# Patient Record
Sex: FEMALE | Race: WHITE | Employment: FULL TIME | URBAN - METROPOLITAN AREA
[De-identification: names, ages, dates, MRNs, and addresses within clinical notes are randomized per-mention and may not be internally consistent; named-entity substitution may affect disease eponyms.]

---

## 2017-03-02 ENCOUNTER — HOSPITAL ENCOUNTER (OUTPATIENT)
Dept: PHYSICAL THERAPY | Age: 22
Discharge: HOME | End: 2017-03-02
Payer: OTHER GOVERNMENT

## 2017-03-02 PROCEDURE — 97110 THERAPEUTIC EXERCISES: CPT | Performed by: PHYSICAL THERAPIST

## 2017-03-02 PROCEDURE — 97162 PT EVAL MOD COMPLEX 30 MIN: CPT | Performed by: PHYSICAL THERAPIST

## 2017-03-02 PROCEDURE — 97014 ELECTRIC STIMULATION THERAPY: CPT | Performed by: PHYSICAL THERAPIST

## 2017-03-02 PROCEDURE — 97140 MANUAL THERAPY 1/> REGIONS: CPT | Performed by: PHYSICAL THERAPIST

## 2017-03-02 NOTE — PROGRESS NOTES
PT DAILY TREATMENT NOTE 3-16    Patient Name: Bao Nam  Date:3/2/2017  : 1995  [x]  Patient  Verified  Payor:  / Plan: [employer] [facility] [address] AND DEPENDENTS / Product Type:  /    In time:10:40  Out time:11:20  Total Treatment Time (min): 40  Total Billable Time: 40  Visit #: 1 of 8-12    Treatment Area: Low back pain [M54.5]    SUBJECTIVE  Pain Level (0-10 scale): 3  Any medication changes, allergies to medications, adverse drug reactions, diagnosis change, or new procedure performed?: [x] No    [] Yes (see summary sheet for update)  Subjective functional status/changes:   [] No changes reported  I am feeling ok today. I feel I am out of alignment. OBJECTIVE    Modality Rationale: Modality 1:  decrease edema, decrease inflammation, decrease pain, increase tissue extensibility and increase muscle contraction/control to improve the patients ability to return to adl's.      Min Type Additional Details   15 [] Estim:  [x]Unatt       []IFC  []Premod                        []Other:  []w/ice   [x]w/heat  Position:prone Location:Lumbar    [] Estim: []Att    []TENS instruct  []NMES                    []Other:  []w/US   []w/ice   []w/heat  Position:  Location:    []  Traction: [] Cervical       []Lumbar                       [] Prone          []Supine                       []Intermittent   []Continuous Lbs:  [] before manual  [] after manual    []  Ultrasound: []Continuous   [] Pulsed                           []1MHz   []3MHz W/cm2:  Location:    []  Iontophoresis with dexamethasone         Location: [] Take home patch   [] In clinic    []  Ice     []  heat  []  Ice massage  []  Laser   []  Anodyne Position:  Location:    []  Laser with stim  []  Other:  Position:  Location:    []  Vasopneumatic Device Pressure:       [] lo [] med [] hi   Temperature: [] lo [] med [] hi   [] Skin assessment post-treatment:  []intact []redness- no adverse reaction         []redness - adverse reaction:     15 min [x]Eval                  []Re-Eval       10 min Manual Therapy:  SI correction for anterior rotation/press down. Left upslip correction   Rationale: decrease pain, increase ROM, increase tissue extensibility and decrease edema  to return to adl's. With   [] TE   [] TA   [] neuro   [] other: Patient Education: [x] Review HEP    [] Progressed/Changed HEP based on:   [] positioning   [] body mechanics   [] transfers   [] heat/ice application    [] other:      Other Objective/Functional Measures: as per eval.     Pain Level (0-10 scale) post treatment: 0    ASSESSMENT/Changes in Function:     Patient will continue to benefit from skilled PT services to modify and progress therapeutic interventions, address functional mobility deficits, address ROM deficits, address strength deficits, analyze and address soft tissue restrictions, analyze and cue movement patterns, analyze and modify body mechanics/ergonomics, assess and modify postural abnormalities, address imbalance/dizziness and instruct in home and community integration to attain remaining goals. [x]  See Plan of Care  []  See progress note/recertification  []  See Discharge Summary         Progress towards goals / Updated goals: · Short Term Goals: To be accomplished in 2 weeks:  1. Patient will be compliant with home exercise program.  2. Patient will demonstrate proper lifting technique in order to return to work activities. · Long Term Goals: To be accomplished in 4 weeks:  1. Patient will increase FOTO Functional Status score to 76 to indicate decreased functional limitations. 2. Patient will report ability to walk for  > 60 minutes without increasing back pain to indicate increased activity tolerance. 3. Patient will be independent with home exercise program for self management of symptoms.   Justification for Eval Code Complexity:  Patient History : gastric sleeve/LBP  Examination strength/ROM/ positive ant rotation R with supine sit test.  Clinical Presentation: evolving and changing  Clinical Decision Making : XHEJ 04    PLAN  []  Upgrade activities as tolerated     [x]  Continue plan of care  []  Update interventions per flow sheet       []  Discharge due to:_  []  Other:_      Ayden Camposather A Case, DPT 3/2/2017  11:22 AM    Future Appointments  Date Time Provider Karely Sloan   3/10/2017 10:00 AM [place] Le[place] A Case, DPT [facility]   3/16/2017 11:30 AM [place] A Case, DPT [facility]   3/17/2017 10:00 AM [place] A Case, DPT [facility]   3/23/2017 12:00 PM Madeleine Garciaes Case, DPT [facility]   3/24/2017 10:00 AM Madeleine Hector Case, DPT [facility]   3/30/2017 2:30 PM Pebbles A Case, DPT [facility]   3/31/2017 10:00 AM Pebbles A Case, DPT [facility]
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
## 2017-03-10 ENCOUNTER — APPOINTMENT (OUTPATIENT)
Dept: PHYSICAL THERAPY | Age: 22
End: 2017-03-10
Payer: OTHER GOVERNMENT

## 2017-03-16 ENCOUNTER — HOSPITAL ENCOUNTER (OUTPATIENT)
Dept: PHYSICAL THERAPY | Age: 22
Discharge: HOME | End: 2017-03-16
Payer: OTHER GOVERNMENT

## 2017-03-16 PROCEDURE — 97140 MANUAL THERAPY 1/> REGIONS: CPT | Performed by: PHYSICAL THERAPIST

## 2017-03-16 PROCEDURE — 97014 ELECTRIC STIMULATION THERAPY: CPT | Performed by: PHYSICAL THERAPIST

## 2017-03-16 PROCEDURE — 97110 THERAPEUTIC EXERCISES: CPT | Performed by: PHYSICAL THERAPIST
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
## 2017-03-17 ENCOUNTER — HOSPITAL ENCOUNTER (OUTPATIENT)
Dept: PHYSICAL THERAPY | Age: 22
Discharge: HOME | End: 2017-03-17
Payer: OTHER GOVERNMENT

## 2017-03-17 PROCEDURE — 97140 MANUAL THERAPY 1/> REGIONS: CPT | Performed by: PHYSICAL THERAPIST

## 2017-03-17 PROCEDURE — 97110 THERAPEUTIC EXERCISES: CPT | Performed by: PHYSICAL THERAPIST

## 2017-03-17 PROCEDURE — 97014 ELECTRIC STIMULATION THERAPY: CPT | Performed by: PHYSICAL THERAPIST

## 2017-03-17 NOTE — PROGRESS NOTES
PT DAILY TREATMENT NOTE 3-16    Patient Name: Reid Lubin  Date:3/17/2017  : 1995  [x]  Patient  Verified  Payor: [facility] / Plan: [employer] [facility] [address] AND DEPENDENTS / Product Type: Maggie Barrs /    In time:10:05 Out time:11:05  Total Treatment Time (min): 60  Total Billable Time: NA  Visit #: 3 of 8-12    Treatment Area: Low back pain [M54.5]    SUBJECTIVE  Pain Level (0-10 scale): 3-4  Any medication changes, allergies to medications, adverse drug reactions, diagnosis change, or new procedure performed?: [x] No    [] Yes (see summary sheet for update)  Subjective functional status/changes:   [] No changes reported  I am feeling sore today. OBJECTIVE    Modality Rationale: Modality 1:  decrease edema, decrease inflammation and increase tissue extensibility to improve the patients ability to return to adl's.      Min Type Additional Details   15 [x] Estim:  []Unatt       [x]IFC  []Premod                        []Other:  []w/ice   [x]w/heat  Position:prone Location:Lumbar spine    [] Estim: []Att    []TENS instruct  []NMES                    []Other:  []w/US   []w/ice   []w/heat  Position:  Location:    []  Traction: [] Cervical       []Lumbar                       [] Prone          []Supine                       []Intermittent   []Continuous Lbs:  [] before manual  [] after manual    []  Ultrasound: []Continuous   [] Pulsed                           []1MHz   []3MHz W/cm2:  Location:    []  Iontophoresis with dexamethasone         Location: [] Take home patch   [] In clinic    []  Ice     []  heat  []  Ice massage  []  Laser   []  Anodyne Position:  Location:    []  Laser with stim  []  Other:  Position:  Location:    []  Vasopneumatic Device Pressure:       [] lo [] med [] hi   Temperature: [] lo [] med [] hi   [] Skin assessment post-treatment:  []intact []redness- no adverse reaction         []redness - adverse reaction:              35 min Therapeutic Exercise:  [x] See flow sheet :   Rationale: increase ROM, increase strength, improve coordination, improve balance and increase proprioception to improve the patients ability to return to adl's. 10 min Manual Therapy: R anterior rotation with correction/L upslip with correction. Rationale: decrease pain, increase ROM, increase tissue extensibility, correct positional vertigo, decrease trigger points and increase postural awareness to return to adl's. With   [] TE   [] TA   [] neuro   [] other: Patient Education: [x] Review HEP    [] Progressed/Changed HEP based on:   [] positioning   [] body mechanics   [] transfers   [] heat/ice application    [] other:      Other Objective/Functional Measures:    Decreased R anterior rotation today noted with supine to sit test.  Patient appears to have improved tolerance with with added exercises today. Pain Level (0-10 scale) post treatment: 3    ASSESSMENT/Changes in Function:   Patient appeared to have good tolerance with added row and extension activities today. Patient will continue to benefit from skilled PT services to modify and progress therapeutic interventions, address functional mobility deficits, address ROM deficits, address strength deficits, analyze and address soft tissue restrictions, analyze and cue movement patterns, analyze and modify body mechanics/ergonomics, assess and modify postural abnormalities, address imbalance/dizziness and instruct in home and community integration to attain remaining goals. [x]  See Plan of Care  []  See progress note/recertification  []  See Discharge Summary         Progress towards goals / Updated goals: · Short Term Goals: To be accomplished in 2 weeks:  1. Patient will be compliant with home exercise program.  2. Patient will demonstrate proper lifting technique in order to return to work activities. · Long Term Goals: To be accomplished in 4 weeks:  1.  Patient will increase FOTO Functional Status score to 76 to indicate decreased functional limitations. 2. Patient will report ability to walk for  > 60 minutes without increasing back pain to indicate increased activity tolerance. 3. Patient will be independent with home exercise program for self management of symptoms.       PLAN  []  Upgrade activities as tolerated     [x]  Continue plan of care  []  Update interventions per flow sheet       []  Discharge due to:_  []  Other:_      Sandra Borjas, MIKI 3/17/2017  10:05 AM    Future Appointments  Date Time Provider Karely Sloan   3/23/2017 12:00 PM Taiwo Borjas, [facility] [address]   3/24/2017 10:00 AM Taiwo Borjas DPT [facility] [address]   3/30/2017 2:30 PM Taiwo Borjas, MIKI [facility] [address]   3/31/2017 10:00 AM Pebbles Borjas DPT [facility] [address]

## 2017-03-23 ENCOUNTER — HOSPITAL ENCOUNTER (OUTPATIENT)
Dept: PHYSICAL THERAPY | Age: 22
Discharge: HOME | End: 2017-03-23
Payer: OTHER GOVERNMENT

## 2017-03-23 PROCEDURE — 97110 THERAPEUTIC EXERCISES: CPT | Performed by: PHYSICAL THERAPIST

## 2017-03-23 PROCEDURE — 97140 MANUAL THERAPY 1/> REGIONS: CPT | Performed by: PHYSICAL THERAPIST

## 2017-03-23 PROCEDURE — 97014 ELECTRIC STIMULATION THERAPY: CPT | Performed by: PHYSICAL THERAPIST

## 2017-03-23 NOTE — PROGRESS NOTES
PT DAILY TREATMENT NOTE 3-16    Patient Name: Timoteo August  Date:3/23/2017  : 1995  [x]  Patient  Verified  Payor:  / Plan: [facility] [address] AND DEPENDENTS / Product Type:  /    In time:12:05 Out time:1:05  Total Treatment Time (min): 60  Total Billable Time: NA  Visit #: 4 of -12    Treatment Area: Low back pain [M54.5]    SUBJECTIVE  Pain Level (0-10 scale): 3-4  Any medication changes, allergies to medications, adverse drug reactions, diagnosis change, or new procedure performed?: [x] No    [] Yes (see summary sheet for update)  Subjective functional status/changes:   [] No changes reported  I am really sore today. OBJECTIVE    Modality Rationale: Modality 1:  decrease edema, decrease inflammation and increase tissue extensibility to improve the patients ability to return to adl's.      Min Type Additional Details   15 [x] Estim:  []Unatt       [x]IFC  []Premod                        []Other:  []w/ice   [x]w/heat  Position:prone Location:Lumbar spine    [] Estim: []Att    []TENS instruct  []NMES                    []Other:  []w/US   []w/ice   []w/heat  Position:  Location:    []  Traction: [] Cervical       []Lumbar                       [] Prone          []Supine                       []Intermittent   []Continuous Lbs:  [] before manual  [] after manual    []  Ultrasound: []Continuous   [] Pulsed                           []1MHz   []3MHz W/cm2:  Location:    []  Iontophoresis with dexamethasone         Location: [] Take home patch   [] In clinic    []  Ice     []  heat  []  Ice massage  []  Laser   []  Anodyne Position:  Location:    []  Laser with stim  []  Other:  Position:  Location:    []  Vasopneumatic Device Pressure:       [] lo [] med [] hi   Temperature: [] lo [] med [] hi   [] Skin assessment post-treatment:  []intact []redness- no adverse reaction         []redness - adverse reaction:              35 min Therapeutic Exercise:  [x] See flow sheet :   Rationale: increase ROM, increase strength, improve coordination, improve balance and increase proprioception to improve the patients ability to return to adl's. 10 min Manual Therapy: R anterior rotation with correction/L upslip with correction. Shot gun technique. MFR to Lumbar paraspinals QL release. Rationale: decrease pain, increase ROM, increase tissue extensibility, correct positional vertigo, decrease trigger points and increase postural awareness to return to adl's. With   [] TE   [] TA   [] neuro   [] other: Patient Education: [x] Review HEP    [] Progressed/Changed HEP based on:   [] positioning   [] body mechanics   [] transfers   [] heat/ice application    [] other:      Other Objective/Functional Measures:    Decreased R anterior rotation today noted with supine to sit test. Discussed possible massage therapy. Patient has sig. Tenderness with palpation to B paraspinals. Pain Level (0-10 scale) post treatment: 0    ASSESSMENT/Changes in Function:   Patient appeared to have sig. Relief with manual tech. Today. Patient has sig increase in pain today for unknown reasons. Patient will continue to benefit from skilled PT services to modify and progress therapeutic interventions, address functional mobility deficits, address ROM deficits, address strength deficits, analyze and address soft tissue restrictions, analyze and cue movement patterns, analyze and modify body mechanics/ergonomics, assess and modify postural abnormalities, address imbalance/dizziness and instruct in home and community integration to attain remaining goals. [x]  See Plan of Care  []  See progress note/recertification  []  See Discharge Summary         Progress towards goals / Updated goals: · Short Term Goals: To be accomplished in 2 weeks:  1. Patient will be compliant with home exercise program.  2. Patient will demonstrate proper lifting technique in order to return to work activities. · Long Term Goals:  To be accomplished in 4 weeks:  1. Patient will increase FOTO Functional Status score to 76 to indicate decreased functional limitations. 2. Patient will report ability to walk for  > 60 minutes without increasing back pain to indicate increased activity tolerance. 3. Patient will be independent with home exercise program for self management of symptoms.       PLAN  []  Upgrade activities as tolerated     [x]  Continue plan of care  []  Update interventions per flow sheet       []  Discharge due to:_  []  Other:_      Melia WELLER Case, MIKI 3/23/2017  1:05 PM    Future Appointments  Date Time Provider Karely Sloan   3/24/2017 10:00 AM Melia Mack A Case, DPT [facility]   3/30/2017 2:30 PM Melia WELLER Case, DPT [facility]   3/31/2017 10:00 AM Melia Mack A Case, DPT [facility]

## 2017-03-24 ENCOUNTER — HOSPITAL ENCOUNTER (OUTPATIENT)
Dept: PHYSICAL THERAPY | Age: 22
Discharge: HOME | End: 2017-03-24
Payer: OTHER GOVERNMENT

## 2017-03-24 PROCEDURE — 97110 THERAPEUTIC EXERCISES: CPT | Performed by: PHYSICAL THERAPIST

## 2017-03-24 PROCEDURE — 97014 ELECTRIC STIMULATION THERAPY: CPT | Performed by: PHYSICAL THERAPIST

## 2017-03-24 NOTE — PROGRESS NOTES
PT DAILY TREATMENT NOTE 3-16    Patient Name: Ronen Curtis  Date:3/24/2017  : 1995  [x]  Patient  Verified  Payor:  / Plan: [employer] [address] [facility] [address] AND DEPENDENTS / Product Type: [employer] /    In time:10:05 Out time:11:10  Total Treatment Time (min): 65  Total Billable Time: NA  Visit #: 5 of 8-12    Treatment Area: Low back pain [M54.5]    SUBJECTIVE  Pain Level (0-10 scale): 2-3  Any medication changes, allergies to medications, adverse drug reactions, diagnosis change, or new procedure performed?: [x] No    [] Yes (see summary sheet for update)  Subjective functional status/changes:   [] No changes reported  I am feeling sore but feel that I may be in good alignment. OBJECTIVE    Modality Rationale: Modality 1:  decrease edema, decrease inflammation and increase tissue extensibility to improve the patients ability to return to adl's.      Min Type Additional Details   15 [x] Estim:  []Unatt       [x]IFC  []Premod                        []Other:  []w/ice   [x]w/heat  Position:prone Location:Lumbar spine    [] Estim: []Att    []TENS instruct  []NMES                    []Other:  []w/US   []w/ice   []w/heat  Position:  Location:    []  Traction: [] Cervical       []Lumbar                       [] Prone          []Supine                       []Intermittent   []Continuous Lbs:  [] before manual  [] after manual    []  Ultrasound: []Continuous   [] Pulsed                           []1MHz   []3MHz W/cm2:  Location:    []  Iontophoresis with dexamethasone         Location: [] Take home patch   [] In clinic    []  Ice     []  heat  []  Ice massage  []  Laser   []  Anodyne Position:  Location:    []  Laser with stim  []  Other:  Position:  Location:    []  Vasopneumatic Device Pressure:       [] lo [] med [] hi   Temperature: [] lo [] med [] hi   [] Skin assessment post-treatment:  []intact []redness- no adverse reaction         []redness - adverse reaction:              50 min Therapeutic Exercise: [x] See flow sheet :   Rationale: increase ROM, increase strength, improve coordination, improve balance and increase proprioception to improve the patients ability to return to adl's. NC min Manual Therapy: checked alignment. Rationale: decrease pain, increase ROM, increase tissue extensibility, correct positional vertigo, decrease trigger points and increase postural awareness to return to adl's. With   [] TE   [] TA   [] neuro   [] other: Patient Education: [x] Review HEP    [] Progressed/Changed HEP based on:   [] positioning   [] body mechanics   [] transfers   [] heat/ice application    [] other:      Other Objective/Functional Measures:    Patient appeared to have good response to manual tech. Last session. Patient will be getting massage this evening. Pain Level (0-10 scale) post treatment: 0    ASSESSMENT/Changes in Function:   Patient in good alignment with supine to sit test today. Patient will continue to benefit from skilled PT services to modify and progress therapeutic interventions, address functional mobility deficits, address ROM deficits, address strength deficits, analyze and address soft tissue restrictions, analyze and cue movement patterns, analyze and modify body mechanics/ergonomics, assess and modify postural abnormalities, address imbalance/dizziness and instruct in home and community integration to attain remaining goals. [x]  See Plan of Care  []  See progress note/recertification  []  See Discharge Summary         Progress towards goals / Updated goals: · Short Term Goals: To be accomplished in 2 weeks:  1. Patient will be compliant with home exercise program.  2. Patient will demonstrate proper lifting technique in order to return to work activities. · Long Term Goals: To be accomplished in 4 weeks:  1. Patient will increase FOTO Functional Status score to 76 to indicate decreased functional limitations.   2. Patient will report ability to walk for  > 60 minutes without increasing back pain to indicate increased activity tolerance. 3. Patient will be independent with home exercise program for self management of symptoms.       PLAN  []  Upgrade activities as tolerated     [x]  Continue plan of care  []  Update interventions per flow sheet       []  Discharge due to:_  []  Other:_      Real Borjas, DPT 3/24/2017  1:05 PM    Future Appointments  Date Time Provider Karely Sloan   3/30/2017 2:30 PM James Borjas, DPT [facility]   3/31/2017 10:00 AM Real Borjas, DPT [facility]

## 2017-03-30 ENCOUNTER — HOSPITAL ENCOUNTER (OUTPATIENT)
Dept: PHYSICAL THERAPY | Age: 22
Discharge: HOME | End: 2017-03-30
Payer: OTHER GOVERNMENT

## 2017-03-30 PROCEDURE — 97014 ELECTRIC STIMULATION THERAPY: CPT | Performed by: PHYSICAL THERAPIST

## 2017-03-30 PROCEDURE — 97110 THERAPEUTIC EXERCISES: CPT | Performed by: PHYSICAL THERAPIST

## 2017-03-30 NOTE — PROGRESS NOTES
PT DAILY TREATMENT NOTE 3-16    Patient Name: Jose A Schulte  Date:3/30/2017  : 1995  [x]  Patient  Verified  Payor:  / Plan: [employer] [facility] [address] AND DEPENDENTS / Product Type:  /    In time:2:30 Out time:3:40  Total Treatment Time (min): 70  Total Billable Time: NA  Visit #: 6 of 8-12    Treatment Area: Low back pain [M54.5]    SUBJECTIVE  Pain Level (0-10 scale): 1  Any medication changes, allergies to medications, adverse drug reactions, diagnosis change, or new procedure performed?: [x] No    [] Yes (see summary sheet for update)  Subjective functional status/changes:   [] No changes reported  The massage really helped.     OBJECTIVE    Modality Rationale: Modality 1:  decrease edema, decrease inflammation, decrease pain, increase tissue extensibility and increase muscle contraction/control to improve the patients ability to return      Min Type Additional Details   15 [x] Estim:  []Unatt       []IFC  []Premod                        []Other:  []w/ice   [x]w/heat  Position: Lumbar  Location:    [] Estim: []Att    []TENS instruct  []NMES                    []Other:  []w/US   []w/ice   []w/heat  Position:  Location:    []  Traction: [] Cervical       []Lumbar                       [] Prone          []Supine                       []Intermittent   []Continuous Lbs:  [] before manual  [] after manual    []  Ultrasound: []Continuous   [] Pulsed                           []1MHz   []3MHz W/cm2:  Location:    []  Iontophoresis with dexamethasone         Location: [] Take home patch   [] In clinic    []  Ice     []  heat  []  Ice massage  []  Laser   []  Anodyne Position:  Location:    []  Laser with stim  []  Other:  Position:  Location:    []  Vasopneumatic Device Pressure:       [] lo [] med [] hi   Temperature: [] lo [] med [] hi   [] Skin assessment post-treatment:  []intact []redness- no adverse reaction         []redness - adverse reaction:              55 min Therapeutic Exercise:  [x] See flow sheet :   Rationale: increase ROM, increase strength, improve coordination, improve balance and increase proprioception to improve the patients ability to return to adl's. With   [] TE   [] TA   [] neuro   [] other: Patient Education: [x] Review HEP    [] Progressed/Changed HEP based on:   [] positioning   [] body mechanics   [] transfers   [] heat/ice application    [] other:      Other Objective/Functional Measures: Discussed possible benefit for TENS unit. Pain Level (0-10 scale) post treatment: 1    ASSESSMENT/Changes in Function:     Patient will continue to benefit from skilled PT services to modify and progress therapeutic interventions, address functional mobility deficits, address ROM deficits, address strength deficits, analyze and address soft tissue restrictions, analyze and cue movement patterns, analyze and modify body mechanics/ergonomics, assess and modify postural abnormalities, address imbalance/dizziness and instruct in home and community integration to attain remaining goals. [x]  See Plan of Care  []  See progress note/recertification  []  See Discharge Summary         Progress towards goals / Updated goals: · Short Term Goals: To be accomplished in 2 weeks:  1. Patient will be compliant with home exercise program.  2. Patient will demonstrate proper lifting technique in order to return to work activities. · Long Term Goals: To be accomplished in 4 weeks:  1. Patient will increase FOTO Functional Status score to 76 to indicate decreased functional limitations. 2. Patient will report ability to stand > 30 minutes without increasing back pain to indicate increased activity tolerance. 3. Patient will be independent with home exercise program for self management of symptoms.     PLAN  []  Upgrade activities as tolerated     [x]  Continue plan of care  []  Update interventions per flow sheet       []  Discharge due to:_  []  Other:_      Jenna WELLER Case, DPT 3/30/2017  2:33 PM    Future Appointments  Date Time Provider Karely Sloan   3/31/2017 10:00 AM Serena WELLER Case, DPT [facility]

## 2017-03-31 ENCOUNTER — HOSPITAL ENCOUNTER (OUTPATIENT)
Dept: PHYSICAL THERAPY | Age: 22
Discharge: HOME | End: 2017-03-31
Payer: OTHER GOVERNMENT

## 2017-03-31 PROCEDURE — 97140 MANUAL THERAPY 1/> REGIONS: CPT | Performed by: PHYSICAL THERAPIST

## 2017-03-31 PROCEDURE — 97110 THERAPEUTIC EXERCISES: CPT | Performed by: PHYSICAL THERAPIST

## 2017-03-31 PROCEDURE — 97014 ELECTRIC STIMULATION THERAPY: CPT | Performed by: PHYSICAL THERAPIST

## 2017-03-31 NOTE — PROGRESS NOTES
[address] [facility]  [address] [place] [address],[address], [facility], [place]  Phone [phone]  Fax  [facility]          Patient Name: Tej Gibson : 1995   Treatment/Medical Diagnosis: Low back pain [M54.5]   Onset Date: 10/3/17    Referral Source: James Zavala MD [facility]): 3/2/17   Prior Hospitalization: See Medical History Provider #: [id number]   Prior Level of Function: I with ADL's. Comorbidities: As listed. Medications: Verified on Patient Summary List   Visits from [facility]: 7 Missed Visits: 0     · Short Term Goals: To be accomplished in 2 weeks:  1. Patient will be compliant with home exercise program.  Met goal at this time. 2. Patient will demonstrate proper lifting technique in order to return to work activities. I with ADL's. Met goal at this time. · Long Term Goals: To be accomplished in 4 weeks:  1. Patient will increase FOTO Functional Status score to 76 to indicate decreased functional limitations. FOTO: 73 progressing towards goal.  FOTO:63 has not met goal.  2. Patient will report ability to walk for  > 60 minutes without increasing back pain to indicate increased activity tolerance. Met goal at this time. 3. Patient will be independent with home exercise program for self management of symptoms. Met goal at this time. G-Codes (GP): NA  Assessments/Recommendations: Discontinue therapy. Progressing towards or have reached established goals. Patient would benefit from Home TENS unit for pain control. Please have MD sign if agrees. If you have any questions/comments please contact us directly at [phone]. Thank you for allowing us to assist in the care of your patient.     Therapist Signature: Arcadio Borjas DPT Date: 3/31/17   Reporting Period: NA Time: 10:51 AM

## 2017-03-31 NOTE — PROGRESS NOTES
PT DAILY TREATMENT NOTE 3-16    Patient Name: Jorge Nathan  Date:3/31/2017  : 1995  [x]  Patient  Verified  Payor: [facility] / Plan: [employer] [facility] [address] AND DEPENDENTS / Product Type: Andrew [employer]us /    In time:10:00  Out time:11:00  Total Treatment Time (min): 60  Total Billable Time: NA  Visit #: 7 of 8-12    Treatment Area: Low back pain [M54.5]    SUBJECTIVE  Pain Level (0-10 scale): 6  Any medication changes, allergies to medications, adverse drug reactions, diagnosis change, or new procedure performed?: [x] No    [] Yes (see summary sheet for update)  Subjective functional status/changes:   [] No changes reported  As per DC. OBJECTIVE    Modality Rationale: Modality 1:  decrease edema, decrease inflammation, decrease pain, increase tissue extensibility and increase muscle contraction/control to improve the patients ability to return to adl's.    Min Type Additional Details   10 [] Estim:  [x]Unatt       []IFC  []Premod                        []Other:  []w/ice   []w/heat  Position:  Location:    [] Estim: []Att    []TENS instruct  []NMES                    []Other:  []w/US   []w/ice   []w/heat  Position:  Location:    []  Traction: [] Cervical       []Lumbar                       [] Prone          []Supine                       []Intermittent   []Continuous Lbs:  [] before manual  [] after manual    []  Ultrasound: []Continuous   [] Pulsed                           []1MHz   []3MHz W/cm2:  Location:    []  Iontophoresis with dexamethasone         Location: [] Take home patch   [] In clinic    []  Ice     []  heat  []  Ice massage  []  Laser   []  Anodyne Position:  Location:    []  Laser with stim  []  Other:  Position:  Location:    []  Vasopneumatic Device Pressure:       [] lo [] med [] hi   Temperature: [] lo [] med [] hi   [] Skin assessment post-treatment:  []intact []redness- no adverse reaction         []redness - adverse reaction:      min []Eval                  []Re-Eval     10 min manual to the Lumbar spine/MFR to B paraspinals. 40 min Therapeutic Exercise:  [x] See flow sheet :   Rationale: increase ROM, increase strength, improve coordination, improve balance and increase proprioception to improve the patients ability to return to adl's. With   [] TE   [] TA   [] neuro   [] other: Patient Education: [x] Review HEP    [] Progressed/Changed HEP based on:   [] positioning   [] body mechanics   [] transfers   [] heat/ice application    [] other:      Other Objective/Functional Measures: as per DC. Pain Level (0-10 scale) post treatment: 5-6    ASSESSMENT/Changes in Function:     Patient will continue to benefit from skilled PT services to modify and progress therapeutic interventions, address functional mobility deficits, address ROM deficits, address strength deficits, analyze and address soft tissue restrictions, analyze and cue movement patterns, analyze and modify body mechanics/ergonomics, assess and modify postural abnormalities, address imbalance/dizziness and instruct in home and community integration to attain remaining goals. [x]  See Plan of Care  []  See progress note/recertification  []  See Discharge Summary         Progress towards goals / Updated goals:  PLAN  []  Upgrade activities as tolerated     [x]  Continue plan of care  []  Update interventions per flow sheet       []  Discharge due to:_  []  Other:_      Pebbles WELLER Case, DPT 3/31/2017  10:51 AM    No future appointments.

## 2018-06-04 ENCOUNTER — HOSPITAL ENCOUNTER (OUTPATIENT)
Dept: PHYSICAL THERAPY | Age: 23
Discharge: HOME | End: 2018-06-04
Payer: COMMERCIAL

## 2018-06-04 PROCEDURE — 97014 ELECTRIC STIMULATION THERAPY: CPT | Performed by: PHYSICAL THERAPIST

## 2018-06-04 PROCEDURE — 97110 THERAPEUTIC EXERCISES: CPT | Performed by: PHYSICAL THERAPIST

## 2018-06-04 PROCEDURE — 97162 PT EVAL MOD COMPLEX 30 MIN: CPT | Performed by: PHYSICAL THERAPIST
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
## 2018-06-11 ENCOUNTER — HOSPITAL ENCOUNTER (OUTPATIENT)
Dept: PHYSICAL THERAPY | Age: 23
Discharge: HOME | End: 2018-06-11
Payer: COMMERCIAL

## 2018-06-11 PROCEDURE — 97014 ELECTRIC STIMULATION THERAPY: CPT | Performed by: PHYSICAL THERAPIST

## 2018-06-11 PROCEDURE — 97140 MANUAL THERAPY 1/> REGIONS: CPT | Performed by: PHYSICAL THERAPIST

## 2018-06-11 PROCEDURE — 97110 THERAPEUTIC EXERCISES: CPT | Performed by: PHYSICAL THERAPIST

## 2018-06-11 NOTE — PROGRESS NOTES
PT DAILY TREATMENT NOTE     Patient Name: Kerri Gray  Date:2018  : 1995  [x]  Patient  Verified  Payor:  / Plan: José Manuel Silveira 73 / Product Type:  /    In time:935am  Out time:1045  Total Treatment Time (min): 70  Total Timed Codes (min): 65  1:1 Treatment Time (min):    Visit #: 2 of     Treatment Area: Low back pain [M54.5]    SUBJECTIVE  Pain Level (0-10 scale): 3-4  Any medication changes, allergies to medications, adverse drug reactions, diagnosis change, or new procedure performed?: [x] No    [] Yes (see summary sheet for update)  Subjective functional status/changes:   [] No changes reported  It really bothers me the worst when I am lying down, and go to try and get up.      OBJECTIVE  Modality rationale: decrease inflammation, decrease pain and increase tissue extensibility to improve the patients ability to perform functional mobility and improve activity  endurance     Min Type Additional Details   15 [x] Estim: []Att   []Unatt        []TENS instruct                  []IFC  [x]Premod   []NMES                     []Other:  []w/US   []w/ice   [x]w/heat  Position:  Location:    []  Traction: [] Cervical       []Lumbar                       [] Prone          []Supine                       []Intermittent   []Continuous Lbs:  [] before manual  [] after manual    []  Ultrasound: []Continuous   [] Pulsed                           []1MHz   []3MHz Location:  W/cm2:    []  Iontophoresis with dexamethasone         Location: [] Take home patch   [] In clinic    []  Ice     []  heat  []  Ice massage Position:  Location:    []  Vasopneumatic Device Pressure:       [] lo [] med [] hi   Temperature: [] lo [] med [] hi   [] Skin assessment post-treatment:  []intact []redness- no adverse reaction       []redness - adverse reaction:       45/40 min Therapeutic Exercise:  [] See flow sheet :   Rationale: increase ROM, increase strength and improve coordination to improve the patients ability to maintain pelvic alignment and strength for functional mobility         10 min Manual Therapy:  Alignment check reveals L upslip and post rot innominate, MET performed with cane, TrPR to R piriformis and ART, sacral decompression MFR. Rationale: decrease pain, increase tissue extensibility, decrease trigger points and increase postural awareness to be able to maintain pelvic alignment            x min Patient Education: [x] Review HEP    [] Progressed/Changed HEP based on:   [] positioning   [] body mechanics   [] transfers   [] heat/ice application        Other Objective/Functional Measures: Initiated therex per flow sheet, decreased endurance with ABD and clam therex. Challenged with OH med ball lifts with maintaining TA draw/ppt. TrP noted in R piriformis with MT. Performed MET with mild correction noted and cavitation on R with long axis distraction. Pain Level (0-10 scale) post treatment: 3-4    ASSESSMENT/Changes in Function: L upslip noted with Post rot innominate, weakness and fatigue noted in B hip ABD and required min A for 8 reps. Increased pain on R with clams and hip ABD    Patient will continue to benefit from skilled PT services to modify and progress therapeutic interventions, address functional mobility deficits, address ROM deficits, address strength deficits, analyze and address soft tissue restrictions, analyze and cue movement patterns, analyze and modify body mechanics/ergonomics and assess and modify postural abnormalities to attain remaining goals. []  See Plan of Care  []  See progress note/recertification  []  See Discharge Summary         Progress towards goals / Updated goals: · Short Term Goals: To be accomplished in  2  weeks:  1) Establish HEP to prevent further disability. 2.) Patient to present with = pelvic alignment 3 sessions in a row, indicating improved core strength. · Long Term Goals:  To be accomplished in  8-12  treatments:  1) Patient to be independent & compliant with HEP in preparation for D/C.  2)  Patient to increase FOTO score to 73 indicating improved functional abilities and QOL. 3)  Patient will report decreased c/o pain to < or = 3/10 to facilitate ADLS and prolonged sitting/sleeping with manageable sx in Low back. 4.) Patient to increase core strength to 4+ globally to facilitate lumbo-pelvic stability and decreased sx/pain in SIJs.  5.) Assess patient's need for a SI belt, if indicated, for sx management during pregnancy.     PLAN  []  Upgrade activities as tolerated     []  Continue plan of care  []  Update interventions per flow sheet       []  Discharge due to:_  []  Other:_      Jose Camreon, PT 6/11/2018  9:02 AM

## 2018-06-18 ENCOUNTER — APPOINTMENT (OUTPATIENT)
Dept: PHYSICAL THERAPY | Age: 23
End: 2018-06-18
Payer: COMMERCIAL

## 2018-06-21 ENCOUNTER — HOSPITAL ENCOUNTER (OUTPATIENT)
Dept: PHYSICAL THERAPY | Age: 23
Discharge: HOME | End: 2018-06-21
Payer: COMMERCIAL

## 2018-06-21 PROCEDURE — 97140 MANUAL THERAPY 1/> REGIONS: CPT

## 2018-06-21 PROCEDURE — 97110 THERAPEUTIC EXERCISES: CPT

## 2018-06-21 NOTE — PROGRESS NOTES
PT DAILY TREATMENT NOTE 8    Patient Name: Sonido Cardona  Date:2018  : 1995  [x]  Patient  Verified  Payor:  / Plan: José Manuel Silveira 73 / Product Type:  /    In time:1135am  Out time: 1235   Total Treatment Time (min): 60  Total Timed Codes (min): 45  1:1 Treatment Time (min):   30  Visit #: 3 of     Treatment Area: Low back pain [M54.5]    SUBJECTIVE  Pain Level (0-10 scale): 4-5/10    Any medication changes, allergies to medications, adverse drug reactions, diagnosis change, or new procedure performed?: [x] No    [] Yes (see summary sheet for update)  Subjective functional status/changes:   [] No changes reported  I do get some relief from the pain coming here, but unfortunately it just comes right back.        OBJECTIVE  Modality rationale: decrease inflammation, decrease pain and increase tissue extensibility to improve the patients ability to perform functional mobility and improve activity  endurance     Min Type Additional Details   H [x] Estim: []Att   []Unatt        []TENS instruct                  []IFC  []Premod   []NMES                     []Other:  []w/US   []w/ice   [x]w/heat  Position:  Location:    []  Traction: [] Cervical       []Lumbar                       [] Prone          []Supine                       []Intermittent   []Continuous Lbs:  [] before manual  [] after manual    []  Ultrasound: []Continuous   [] Pulsed                           []1MHz   []3MHz Location:  W/cm2:    []  Iontophoresis with dexamethasone         Location: [] Take home patch   [] In clinic   15 []  Ice     [x]  heat  []  Ice massage Position:Left s/L  Location: Right SI/LBP    []  Vasopneumatic Device Pressure:       [] lo [] med [] hi   Temperature: [] lo [] med [] hi   [] Skin assessment post-treatment:  []intact []redness- no adverse reaction       []redness - adverse reaction:       30/25 min Therapeutic Exercise:  [x] See flow sheet :5 min NC for warm up   Rationale: increase ROM, increase strength and improve coordination to improve the patients ability to maintain pelvic alignment and strength for functional mobility         15 min Manual Therapy:  Alignment check reveals L upslip and post rot innominate, MET performed to correct for upslip and rotation including shotgun technique. TrPR to B piriformis and Left QLs   Rationale: decrease pain, increase tissue extensibility, decrease trigger points and increase postural awareness to be able to maintain pelvic alignment            x min Patient Education: [x] Review HEP    [] Progressed/Changed HEP based on:   [] positioning   [] body mechanics   [] transfers   [] heat/ice application        Other Objective/Functional Measures: Moderate correction noted post manual therapy , but pt continues to demonstrate LEft upslip with posterior ilium rotation. TrP palpable in B Piriformis Rt>LEft and Left QLs. Held NMES this session due to pt presents with 2nd trimester pregnancy. Pain Level (0-10 scale) post treatment: 4-5/10    ASSESSMENT/Changes in Function: Patient continues to demonstrate difficulty with lying on back due to increased pain. Modified some therex to s/l , standing or prone position to allow for better tolerance to therex. Patient will continue to benefit from skilled PT services to modify and progress therapeutic interventions, address functional mobility deficits, address ROM deficits, address strength deficits, analyze and address soft tissue restrictions, analyze and cue movement patterns, analyze and modify body mechanics/ergonomics and assess and modify postural abnormalities to attain remaining goals. []  See Plan of Care  []  See progress note/recertification  []  See Discharge Summary         Progress towards goals / Updated goals: · Short Term Goals: To be accomplished in  2  weeks:  1) Establish HEP to prevent further disability.   RESOLVED 6/21/18 HLL   2.) Patient to present with = pelvic alignment 3 sessions in a row, indicating improved core strength. · Long Term Goals: To be accomplished in  8-12  treatments:  1) Patient to be independent & compliant with HEP in preparation for D/C.  2)  Patient to increase FOTO score to 73 indicating improved functional abilities and QOL. 3)  Patient will report decreased c/o pain to < or = 3/10 to facilitate ADLS and prolonged sitting/sleeping with manageable sx in Low back. 4.) Patient to increase core strength to 4+ globally to facilitate lumbo-pelvic stability and decreased sx/pain in SIJs.  5.) Assess patient's need for a SI belt, if indicated, for sx management during pregnancy.     PLAN  []  Upgrade activities as tolerated     [x]  Continue plan of care  []  Update interventions per flow sheet       []  Discharge due to:_  []  Other:_      Nola Sterling, PT 6/21/2018  9:02 AM

## 2018-06-25 ENCOUNTER — HOSPITAL ENCOUNTER (OUTPATIENT)
Dept: PHYSICAL THERAPY | Age: 23
Discharge: HOME | End: 2018-06-25
Payer: COMMERCIAL

## 2018-06-25 PROCEDURE — 97140 MANUAL THERAPY 1/> REGIONS: CPT

## 2018-06-25 PROCEDURE — 97110 THERAPEUTIC EXERCISES: CPT

## 2018-06-25 NOTE — PROGRESS NOTES
PT DAILY TREATMENT NOTE     Patient Name: Heydi Acharya  Date:2018  : 1995  [x]  Patient  Verified  Payor:  / Plan: [employer] Sil[address] 73 / Product Type:  /    In time: 332 pm  Out time: 415  Total Treatment Time (min): 43    Visit #: 4 of     Treatment Area: Low back pain [M54.5]    SUBJECTIVE  Pain Level (0-10 scale): 4/10    Any medication changes, allergies to medications, adverse drug reactions, diagnosis change, or new procedure performed?: [x] No    [] Yes (see summary sheet for update)  Subjective functional status/changes:   [] No changes reported. Patient reports she had OB appt and everything is going good with her pregnancy.  Patient is no longer permitted to be in prone position    OBJECTIVE  Modality rationale: decrease inflammation, decrease pain and increase tissue extensibility to improve the patients ability to perform functional mobility and improve activity  endurance     Min Type Additional Details   H [x] Estim: []Att   []Unatt        []TENS instruct                  []IFC  []Premod   []NMES                     []Other:  []w/US   []w/ice   [x]w/heat  Position:  Location:    []  Traction: [] Cervical       []Lumbar                       [] Prone          []Supine                       []Intermittent   []Continuous Lbs:  [] before manual  [] after manual    []  Ultrasound: []Continuous   [] Pulsed                           []1MHz   []3MHz Location:  W/cm2:    []  Iontophoresis with dexamethasone         Location: [] Take home patch   [] In clinic   H []  Ice     [x]  heat  []  Ice massage Position:Left s/L  Location: Right SI/LBP    []  Vasopneumatic Device Pressure:       [] lo [] med [] hi   Temperature: [] lo [] med [] hi   [] Skin assessment post-treatment:  []intact []redness- no adverse reaction       []redness - adverse reaction:       30/25 min Therapeutic Exercise:  [x] See flow sheet :5 min NC for warm up   Rationale: increase ROM, increase strength and improve coordination to improve the patients ability to maintain pelvic alignment and strength for functional mobility         13 min Manual Therapy:  Alignment check reveals L upslip, MET performed to correct for upslip including shotgun technique. TrPR to B piriformis and Left QLs   Rationale: decrease pain, increase tissue extensibility, decrease trigger points and increase postural awareness to be able to maintain pelvic alignment            x min Patient Education: [x] Review HEP    [] Progressed/Changed HEP based on:   [] positioning   [] body mechanics   [] transfers   [] heat/ice application        Other Objective/Functional Measures:   Modified therex program to DC Supine (due to increased pain) and prone (due to pregnancy) therex. Exercise to focus on core strength in seated, sidelying or standing positions. Overall decreased core strength demonstrated by lateral sway with walk a way therex with TB. Increased tension and spasms noted in B Piriformis with increased pain in Right. Initiated conversation regarding need for SI stabilizing belt as hypermobile SIJ will likely continue to worsen throughout pregnancy with continued release of relaxin and increased abdominal pressure. Pain Level (0-10 scale) post treatment: 6 /10    ASSESSMENT/Changes in Function:   Patient continues to present with pelvic hypermobility and decreased core strength. Tolerated new standing therex fair with VCs and TCs required for form. Patient will continue to benefit from skilled PT services to modify and progress therapeutic interventions, address functional mobility deficits, address ROM deficits, address strength deficits, analyze and address soft tissue restrictions, analyze and cue movement patterns, analyze and modify body mechanics/ergonomics and assess and modify postural abnormalities to attain remaining goals.      []  See Plan of Care  []  See progress note/recertification  []  See Discharge Summary         Progress towards goals / Updated goals: · Short Term Goals: To be accomplished in  2  weeks:  1) Establish HEP to prevent further disability. RESOLVED 6/21/18 HLL   2.) Patient to present with = pelvic alignment 3 sessions in a row, indicating improved core strength. · Long Term Goals: To be accomplished in  8-12  treatments:  1) Patient to be independent & compliant with HEP in preparation for D/C.  2)  Patient to increase FOTO score to 73 indicating improved functional abilities and QOL. 3)  Patient will report decreased c/o pain to < or = 3/10 to facilitate ADLS and prolonged sitting/sleeping with manageable sx in Low back. 4.) Patient to increase core strength to 4+ globally to facilitate lumbo-pelvic stability and decreased sx/pain in SIJs.  5.) Assess patient's need for a SI belt, if indicated, for sx management during pregnancy. PLAN  [x]  Upgrade activities as tolerated     [x]  Continue plan of care  []  Update interventions per flow sheet       []  Discharge due to:_  [x]  Other:_  Follow up with patient regarding recommendations for SI belt.    Anni Jay, PT 6/25/2018  9:02 AM

## 2018-06-28 ENCOUNTER — HOSPITAL ENCOUNTER (OUTPATIENT)
Dept: PHYSICAL THERAPY | Age: 23
Discharge: HOME | End: 2018-06-28
Payer: COMMERCIAL

## 2018-06-28 PROCEDURE — 97110 THERAPEUTIC EXERCISES: CPT

## 2018-06-28 PROCEDURE — 97140 MANUAL THERAPY 1/> REGIONS: CPT

## 2018-06-28 NOTE — PROGRESS NOTES
PT DAILY TREATMENT NOTE     Patient Name: Carlos Hamilton  Date:2018  : 1995  [x]  Patient  Verified  Payor:  / Plan: [employer] 73 / Product Type:  /    In time:833  Out time: 935   Total Treatment Time (min): 62  Total Timed Codes (min): 52  1:1 Treatment Time (min):   Visit #: 5 of     Treatment Area: Low back pain [M54.5]    SUBJECTIVE  Pain Level (0-10 scale): 5/10    Any medication changes, allergies to medications, adverse drug reactions, diagnosis change, or new procedure performed?: [x] No    [] Yes (see summary sheet for update)  Subjective functional status/changes:   [] No changes reported  The back feels better when I leave but it comes right back. Pt c/o nausea this a.m. But took her zofran which helped.       OBJECTIVE  Modality rationale: decrease inflammation, decrease pain and increase tissue extensibility to improve the patients ability to perform functional mobility and improve activity  endurance     Min Type Additional Details    [] Estim: []Att   []Unatt        []TENS instruct                  []IFC  []Premod   []NMES                     []Other:  []w/US   []w/ice   [x]w/heat  Position:  Location:    []  Traction: [] Cervical       []Lumbar                       [] Prone          []Supine                       []Intermittent   []Continuous Lbs:  [] before manual  [] after manual    []  Ultrasound: []Continuous   [] Pulsed                           []1MHz   []3MHz Location:  W/cm2:    []  Iontophoresis with dexamethasone         Location: [] Take home patch   [] In clinic    []  Ice     [x]  heat  []  Ice massage Position:Left s/L  Location: Right SI/LBP    []  Vasopneumatic Device Pressure:       [] lo [] med [] hi   Temperature: [] lo [] med [] hi   [] Skin assessment post-treatment:  []intact []redness- no adverse reaction       []redness - adverse reaction:       27 min Therapeutic Exercise:  [x] See flow sheet :10 min NC for warm up Rationale: increase ROM, increase strength and improve coordination to improve the patients ability to maintain pelvic alignment and strength for functional mobility         25 min Manual Therapy:  Leg length measurements performed (see below); Alignment check reveals L upslip; METs performed to correct upslip; KT star technique applied to the SI joint  Patient was educated on Kinesiotape precautions, indications, contraindications, and instructed to remove if irritation/rash/redness/pain increases or develops. Patient with verbalized understanding of all. Rationale: decrease pain, increase tissue extensibility, decrease trigger points and increase postural awareness to be able to maintain pelvic alignment            x min Patient Education: [x] Review HEP    [] Progressed/Changed HEP based on:   [] positioning   [] body mechanics   [] transfers   [] heat/ice application        Other Objective/Functional Measures:   Leg length measures:   True: L 82cm R 82cm  Apparent: L 98cm R 98.5cm  Upslip corrected with METs after 4 attempts  Negative supine-sit    Pain Level (0-10 scale) post treatment: 3/10    ASSESSMENT/Changes in Function:   Pt unable to tolerate mini squats without increased SI pain so they were discontinued. Pt able to perform modified side planks and modified plank with VCs for form for 30 sec and 15 sec respectively. Pt is on no formal walking program to date and one was initiated today. Pt instructed to ambulate 20-30 min continuously daily for endurance and with use of 1lb hand weights prn for increased core activation. Pt instructed to purchase a physioball for stability exercises at home. Discontinued therex prone and supine as pt can no longer tolerate. Pt advised to schedule PT appointments at time of day where she does not experience nausea. No c/o nausea this session after warm up on TM.  Recommended use of SI belt for increased stability for functional activities at work and with walking program.     Patient will continue to benefit from skilled PT services to modify and progress therapeutic interventions, address functional mobility deficits, address ROM deficits, address strength deficits, analyze and address soft tissue restrictions, analyze and cue movement patterns, analyze and modify body mechanics/ergonomics and assess and modify postural abnormalities to attain remaining goals. []  See Plan of Care  []  See progress note/recertification  []  See Discharge Summary         Progress towards goals / Updated goals: · Short Term Goals: To be accomplished in  2  weeks:  1) Establish HEP to prevent further disability. RESOLVED 6/21/18 HLL   2.) Patient to present with = pelvic alignment 3 sessions in a row, indicating improved core strength. · Long Term Goals: To be accomplished in  8-12  treatments:  1) Patient to be independent & compliant with HEP in preparation for D/C. -UPDATED HEP 6/28/18  2)  Patient to increase FOTO score to 73 indicating improved functional abilities and QOL. 3)  Patient will report decreased c/o pain to < or = 3/10 to facilitate ADLS and prolonged sitting/sleeping with manageable sx in Low back. 4.) Patient to increase core strength to 4+ globally to facilitate lumbo-pelvic stability and decreased sx/pain in SIJs.  5.) Assess patient's need for a SI belt, if indicated, for sx management during pregnancy.     PLAN  []  Upgrade activities as tolerated     [x]  Continue plan of care  []  Update interventions per flow sheet       []  Discharge due to:_  [x]  Other:_  Continue with core stabilization terri Nina, PT 6/28/2018  9:02 AM

## 2018-06-29 ENCOUNTER — APPOINTMENT (OUTPATIENT)
Dept: PHYSICAL THERAPY | Age: 23
End: 2018-06-29
Payer: COMMERCIAL

## 2018-07-02 ENCOUNTER — APPOINTMENT (OUTPATIENT)
Dept: PHYSICAL THERAPY | Age: 23
End: 2018-07-02
Payer: COMMERCIAL

## 2018-07-03 ENCOUNTER — APPOINTMENT (OUTPATIENT)
Dept: PHYSICAL THERAPY | Age: 23
End: 2018-07-03
Payer: COMMERCIAL

## 2018-07-06 ENCOUNTER — HOSPITAL ENCOUNTER (OUTPATIENT)
Dept: PHYSICAL THERAPY | Age: 23
End: 2018-07-06
Payer: COMMERCIAL

## 2018-07-09 ENCOUNTER — HOSPITAL ENCOUNTER (OUTPATIENT)
Dept: PHYSICAL THERAPY | Age: 23
Discharge: HOME | End: 2018-07-09
Payer: COMMERCIAL

## 2018-07-09 PROCEDURE — 97110 THERAPEUTIC EXERCISES: CPT | Performed by: PHYSICAL THERAPIST

## 2018-07-09 NOTE — PROGRESS NOTES
PT DAILY TREATMENT NOTE     Patient Name: Timoteo August  Date:2018  : 1995  [x]  Patient  Verified  Payor: BLUE CROSS / Plan: [facility] / Product Type: PPO /    In time:332 Out time: 414  Total Treatment Time (min): 42  Visit #: 5 of     Treatment Area: Low back pain [M54.5]    SUBJECTIVE  Pain Level (0-10 scale): 10    Any medication changes, allergies to medications, adverse drug reactions, diagnosis change, or new procedure performed?: [x] No    [] Yes (see summary sheet for update)  Subjective functional status/changes:   [] No changes reported  Pt reports 70% improvement of symptoms. She reports that she has learned a lot of exercises to help strengthen her back. She is still in a lot of pain depending on the day. She reports that she has been really sick when she eats and that the last week has been really bad. Functional limitations include laying down, prolonged walking. OBJECTIVE  42 min Therapeutic Exercise:  [x] See flow sheet :Pt reassessment   Rationale: increase ROM, increase strength and improve coordination to improve the patients ability to maintain pelvic alignment and strength for functional mobility          x min Patient Education: [x] Review HEP    [] Progressed/Changed HEP based on:   [] positioning   [] body mechanics   [] transfers   [] heat/ice application        Other Objective/Functional Measures: FOTO: improved to 62/100 from 50/100 at eval      Pain Level (0-10 scale) post treatment: 3/10    ASSESSMENT/Changes in Function:   Limited tolerance to PT secondary to pain and illness. Alignment/strength not assessed as pt reports feeling weak and fatigued. Educated pt to contact OB office. Pt called OB and was recommended that she leave and go to the ER due to subjective reports during pregnancy. Per past visits pt continues to present with pelvic misalignment secondary to muscle weakness and possible hormonal changes due to current pregnancy.  Pt would benefit from continued therapy to address above deficits to decrease pain with functional mobility during pregnancy. Patient will continue to benefit from skilled PT services to modify and progress therapeutic interventions, address functional mobility deficits, address ROM deficits, address strength deficits, analyze and address soft tissue restrictions, analyze and cue movement patterns, analyze and modify body mechanics/ergonomics and assess and modify postural abnormalities to attain remaining goals. Progress towards goals / Updated goals: · Short Term Goals: To be accomplished in  2  weeks:  1) Establish HEP to prevent further disability. RESOLVED 6/21/18 HLL   2.) Patient to present with = pelvic alignment 3 sessions in a row, indicating improved core strength. Not met, not level last 3 sessions  7.9/18     · Long Term Goals: To be accomplished in  8-12  treatments:  1) Patient to be independent & compliant with HEP in preparation for D/C. -UPDATED HEP 6/28/18  2)  Patient to increase FOTO score to 73 indicating improved functional abilities and QOL. Progressing 7/9/18  3)  Patient will report decreased c/o pain to < or = 3/10 to facilitate ADLS and prolonged sitting/sleeping with manageable sx in Low back. Progressing 3/10 this session 7/9/18  4.) Patient to increase core strength to 4+ globally to facilitate lumbo-pelvic stability and decreased sx/pain in SIJs. NT see above 7/9/18  5.) Assess patient's need for a SI belt, if indicated, for sx management during pregnancy.  Pt denies at this time 7/9/18    PLAN  []  Upgrade activities as tolerated     [x]  Continue plan of care  []  Update interventions per flow sheet       []  Discharge due to:_  [x]  Other:_  Continue PT 2x/week for 4 weeks     Darling Read DPT 7/9/2018  439 PM

## 2018-07-09 NOTE — PROGRESS NOTES
[address] [facility]  [address] [facility] [address] [address],[address], [place], [place]  Phone [phone]  Fax [phone]  PROGRESS NOTE  Patient Name: Kole Pedersen : 1995   Treatment/Medical Diagnosis: Low back pain [M54.5]   Referral Source: Chris Mojica NP     Date of Initial Visit: 18 Attended Visits: 6 Missed Visits: 2     SUMMARY OF TREATMENT  Pt seen for 6 therapy sessions for LBP with pregnancy. Therapy has included strengthening for stabilization and manual to correct SI alignment with MET. CURRENT STATUS  Pt reports 70% improvement of symptoms. She reports that she has learned a lot of exercises to help strengthen her back. She is still in a lot of pain depending on the day. She reports that she has been really sick when she eats and that the last week has been really bad. Functional limitations include laying down, prolonged walking. Limited tolerance to PT secondary to pain and illness. Alignment/strength not assessed as pt reports feeling weak and fatigued. Educated pt to contact OB office. Pt called OB and was recommended that she leave and go to the ER due to subjective reports during pregnancy. Per past visits pt continues to present with pelvic misalignment secondary to muscle weakness and possible hormonal changes due to current pregnancy. Pt would benefit from continued therapy to address above deficits by increasing core/lumbar/hip strength to decrease pain with functional mobility during pregnancy. Goal/Measure of Progress Goal Met? · Short Term Goals: To be accomplished in  2  weeks:  1) Establish HEP to prevent further disability.  RESOLVED 18 HLL   2.) Patient to present with = pelvic alignment 3 sessions in a row, indicating improved core strength. Not met, not level last 3 sessions  7.     · Long Term Goals:  To be accomplished in  8-12  treatments:  1) Patient to be independent & compliant with HEP in preparation for D/C. -UPDATED HEP 6/28/18  2)  Patient to increase FOTO score to 73 indicating improved functional abilities and QOL. Progressing 7/9/18  3)  Patient will report decreased c/o pain to < or = 3/10 to facilitate ADLS and prolonged sitting/sleeping with manageable sx in Low back. Progressing 3/10 this session 7/9/18  4.) Patient to increase core strength to 4+ globally to facilitate lumbo-pelvic stability and decreased sx/pain in SIJs. NT see above 7/9/18  5.) Assess patient's need for a SI belt, if indicated, for sx management during pregnancy. Pt denies at this time 7/9/18    New Goals to be achieved in __4__  weeks:  Continue unmet goals stated above   G-Codes: NA  RECOMMENDATIONS  Continue PT 2x/week for 4 weeks to progress towards long-term goals  If you have any questions/comments please contact us directly at [phone]. Thank you for allowing us to assist in the care of your patient. Therapist Signature: Sixto Armas DPT Date: 7/9/2018     Time: 4:40 PM   NOTE TO PHYSICIAN:  PLEASE COMPLETE THE ORDERS BELOW AND FAX TO   [facility] [place]: [phone]. If you are unable to process this request in 24 hours please contact our office: [phone].    ___ I have read the above report and request that my patient continue as recommended.   ___ I have read the above report and request that my patient continue therapy with the following changes/special instructions:_________________________________________________________   ___ I have read the above report and request that my patient be discharged from therapy.      Physician Signature:        Date:       Time:

## 2018-07-11 ENCOUNTER — APPOINTMENT (OUTPATIENT)
Dept: PHYSICAL THERAPY | Age: 23
End: 2018-07-11
Payer: COMMERCIAL

## 2018-07-20 ENCOUNTER — APPOINTMENT (OUTPATIENT)
Dept: PHYSICAL THERAPY | Age: 23
End: 2018-07-20
Payer: COMMERCIAL

## 2018-07-23 ENCOUNTER — HOSPITAL ENCOUNTER (OUTPATIENT)
Dept: PHYSICAL THERAPY | Age: 23
Discharge: HOME | End: 2018-07-23
Payer: COMMERCIAL

## 2018-07-23 PROCEDURE — 97110 THERAPEUTIC EXERCISES: CPT | Performed by: PHYSICAL THERAPIST

## 2018-07-23 PROCEDURE — 97140 MANUAL THERAPY 1/> REGIONS: CPT | Performed by: PHYSICAL THERAPIST

## 2018-07-23 NOTE — PROGRESS NOTES
PT DAILY TREATMENT NOTE     Patient Name: Shantal Salazar  Date:2018  : 1995  [x]  Patient  Verified  Payor: ABRIL / Plan: [employer] 73 / Product Type: Thaxton Sinning /    In [id number]  Out time: 130  Total Treatment Time (min): 58  Visit #: 1 of 8    Treatment Area: Low back pain [M54.5]    SUBJECTIVE  Pain Level (0-10 scale): 6/10    Any medication changes, allergies to medications, adverse drug reactions, diagnosis change, or new procedure performed?: [x] No    [] Yes (see summary sheet for update)  Subjective functional status/changes:   [] No changes reported  I am really sore and I am not sure what I did. I slept on my couch yesterday and I have done that before and didn't have pain.        OBJECTIVE  Modality rationale: decrease inflammation, decrease pain and increase tissue extensibility to improve the patients ability to perform functional mobility and improve activity  endurance     Min Type Additional Details    [] Estim: []Att   []Unatt        []TENS instruct                  []IFC  []Premod   []NMES                     []Other:  []w/US   []w/ice   [x]w/heat  Position:  Location:    []  Traction: [] Cervical       []Lumbar                       [] Prone          []Supine                       []Intermittent   []Continuous Lbs:  [] before manual  [] after manual    []  Ultrasound: []Continuous   [] Pulsed                           []1MHz   []3MHz Location:  W/cm2:    []  Iontophoresis with dexamethasone         Location: [] Take home patch   [] In clinic   10 []  Ice     [x]  heat  []  Ice massage Position:R SL  Location: LBP    []  Vasopneumatic Device Pressure:       [] lo [] med [] hi   Temperature: [] lo [] med [] hi   [] Skin assessment post-treatment:  []intact []redness- no adverse reaction       []redness  adverse reaction:       38 min Therapeutic Exercise:  [x] See flow sheet :10 min NC for warm up   Rationale: increase ROM, increase strength and improve coordination to improve the patients ability to maintain pelvic alignment and strength for functional mobility    10 min Manual Therapy:  MET to correct L posterior rotation   Rationale: decrease pain, increase tissue extensibility, decrease trigger points and increase postural awareness to be able to maintain pelvic alignment            x min Patient Education: [x] Review HEP    [] Progressed/Changed HEP based on:   [] positioning   [] body mechanics   [] transfers   [] heat/ice application        Other Objective/Functional Measures:   L posterior rotation correct with MET    Pain Level (0-10 scale) post treatment: 3/10    ASSESSMENT/Changes in Function:   Pt continues to present with mis-aligned pelvis secondary to core/hip weakness and current pregnancy. Pt would benefit from continued therapy to improve stability to decrease low back pain with pregnancy. Patient will continue to benefit from skilled PT services to modify and progress therapeutic interventions, address functional mobility deficits, address ROM deficits, address strength deficits, analyze and address soft tissue restrictions, analyze and cue movement patterns, analyze and modify body mechanics/ergonomics and assess and modify postural abnormalities to attain remaining goals. Progress towards goals / Updated goals:  `.) Patient to present with = pelvic alignment 3 sessions in a row, indicating improved core strength. Not met, not level last 3 sessions  7.9/18      · Long Term Goals: To be accomplished in  8-12  treatments:  1) Patient to be independent & compliant with HEP in preparation for D/C. -UPDATED HEP 6/28/18  2)  Patient to increase FOTO score to 73 indicating improved functional abilities and QOL. Progressing 7/9/18  3)  Patient will report decreased c/o pain to < or = 3/10 to facilitate ADLS and prolonged sitting/sleeping with manageable sx in Low back.  Progressing 3/10 this session 7/9/18  4.) Patient to increase core strength to 4+ globally to facilitate lumbo-pelvic stability and decreased sx/pain in SIJs. NT see above 7/9/18  5.) Assess patient's need for a SI belt, if indicated, for sx management during pregnancy.  Pt denies at this time 7/9/18    PLAN  [x]  Upgrade activities as tolerated     [x]  Continue plan of care  []  Update interventions per flow sheet       []  Discharge due to:_  []  Other:_      Comfort Huerta DPT 7/23/2018  447 PM

## 2018-07-23 NOTE — PROGRESS NOTES
PT DAILY TREATMENT NOTE     Patient Name: Abeba Bob  Date:2018  : 1995  [x]  Patient  Verified  Payor: ABRIL / Plan: José Manuel Silveira 73 / Product Type:  /    In time:332 Out time: 414  Total Treatment Time (min): 42  Visit #: 5 of     Treatment Area: Low back pain [M54.5]    SUBJECTIVE  Pain Level (0-10 scale): 10    Any medication changes, allergies to medications, adverse drug reactions, diagnosis change, or new procedure performed?: [x] No    [] Yes (see summary sheet for update)  Subjective functional status/changes:   [] No changes reported  Pt reports 70% improvement of symptoms. She reports that she has learned a lot of exercises to help strengthen her back. She is still in a lot of pain depending on the day. She reports that she has been really sick when she eats and that the last week has been really bad. Functional limitations include laying down, prolonged walking. OBJECTIVE  42 min Therapeutic Exercise:  [x] See flow sheet :Pt reassessment   Rationale: increase ROM, increase strength and improve coordination to improve the patients ability to maintain pelvic alignment and strength for functional mobility          x min Patient Education: [x] Review HEP    [] Progressed/Changed HEP based on:   [] positioning   [] body mechanics   [] transfers   [] heat/ice application        Other Objective/Functional Measures: FOTO: improved to 62/100 from 50/100 at eval      Pain Level (0-10 scale) post treatment: 3/10    ASSESSMENT/Changes in Function:   Limited tolerance to PT secondary to pain and illness. Alignment/strength not assessed as pt reports feeling weak and fatigued. Educated pt to contact OB office. Pt called OB and was recommended that she leave and go to the ER due to subjective reports during pregnancy. Per past visits pt continues to present with pelvic misalignment secondary to muscle weakness and possible hormonal changes due to current pregnancy. Pt would benefit from continued therapy to address above deficits to decrease pain with functional mobility during pregnancy. Patient will continue to benefit from skilled PT services to modify and progress therapeutic interventions, address functional mobility deficits, address ROM deficits, address strength deficits, analyze and address soft tissue restrictions, analyze and cue movement patterns, analyze and modify body mechanics/ergonomics and assess and modify postural abnormalities to attain remaining goals. Progress towards goals / Updated goals: · Short Term Goals: To be accomplished in  2  weeks:  1) Establish HEP to prevent further disability. RESOLVED 6/21/18 HLL   2.) Patient to present with = pelvic alignment 3 sessions in a row, indicating improved core strength. Not met, not level last 3 sessions  7.9/18     · Long Term Goals: To be accomplished in  8-12  treatments:  1) Patient to be independent & compliant with HEP in preparation for D/C. -UPDATED HEP 6/28/18  2)  Patient to increase FOTO score to 73 indicating improved functional abilities and QOL. Progressing 7/9/18  3)  Patient will report decreased c/o pain to < or = 3/10 to facilitate ADLS and prolonged sitting/sleeping with manageable sx in Low back. Progressing 3/10 this session 7/9/18  4.) Patient to increase core strength to 4+ globally to facilitate lumbo-pelvic stability and decreased sx/pain in SIJs. NT see above 7/9/18  5.) Assess patient's need for a SI belt, if indicated, for sx management during pregnancy.  Pt denies at this time 7/9/18    PLAN  []  Upgrade activities as tolerated     [x]  Continue plan of care  []  Update interventions per flow sheet       []  Discharge due to:_  [x]  Other:_  Continue PT 2x/week for 4 weeks     Medina Elkins DPT 7/23/2018  439 PM

## 2018-07-27 ENCOUNTER — APPOINTMENT (OUTPATIENT)
Dept: PHYSICAL THERAPY | Age: 23
End: 2018-07-27
Payer: COMMERCIAL

## 2018-07-30 ENCOUNTER — HOSPITAL ENCOUNTER (OUTPATIENT)
Dept: PHYSICAL THERAPY | Age: 23
Discharge: HOME | End: 2018-07-30
Payer: COMMERCIAL

## 2018-07-30 PROCEDURE — 97110 THERAPEUTIC EXERCISES: CPT | Performed by: PHYSICAL THERAPIST

## 2018-07-30 NOTE — PROGRESS NOTES
PT DAILY TREATMENT NOTE 8- Patient Name: Jatin Bermeo 
Date:2018 : 1995 [x]  Patient  Verified Payor: ABRIL / Plan: José Manuel Silveira 74 / Product Type: Mauro Grigsby / In time:536 Out time: 620 Total Treatment Time (min): 44 Visit #: 3 of 8 Treatment Area: Low back pain [M54.5] SUBJECTIVE Pain Level (0-10 scale): 4/10 Any medication changes, allergies to medications, adverse drug reactions, diagnosis change, or new procedure performed?: [x] No    [] Yes (see summary sheet for update) Subjective functional status/changes:   [] No changes reported I am not as sore as I had been. OBJECTIVE Modality rationale: decrease inflammation, decrease pain and increase tissue extensibility to improve the patients ability to perform functional mobility and improve activity  endurance Min Type Additional Details  
 [] Estim: []Att   []Unatt        []TENS instruct []IFC  []Premod   []NMES []Other:  []w/US   []w/ice   [x]w/heat Position: Location:  
 []  Traction: [] Cervical       []Lumbar 
                     [] Prone          []Supine []Intermittent   []Continuous Lbs: 
[] before manual 
[] after manual  
 []  Ultrasound: []Continuous   [] Pulsed []1MHz   []3MHz Location: 
W/cm2:  
 []  Iontophoresis with dexamethasone Location: [] Take home patch  
[] In clinic PD []  Ice     [x]  heat 
[]  Ice massage Position:R SL 
Location: LBP []  Vasopneumatic Device Pressure:       [] lo [] med [] hi  
Temperature: [] lo [] med [] hi  
[] Skin assessment post-treatment:  []intact []redness- no adverse reaction 
     []redness  adverse reaction:  
 
 
44 min Therapeutic Exercise:  [x] See flow sheet : added Quadruped therex, lateral/front walk-away Rationale: increase ROM, increase strength and improve coordination to improve the patients ability to maintain pelvic alignment and strength for functional mobility H min Manual Therapy:  MET to correct L posterior rotation Rationale: decrease pain, increase tissue extensibility, decrease trigger points and increase postural awareness to be able to maintain pelvic alignment 
 
       
x min Patient Education: [x] Review HEP    [] Progressed/Changed HEP based on:  
[] positioning   [] body mechanics   [] transfers   [] heat/ice application Other Objective/Functional Measures:  
 
Pain Level (0-10 scale) post treatment: 3/10 ASSESSMENT/Changes in Function:  
Pt tolerated added therex without increased pain. Continue to progress as tolerated to improve core/hip/lumbar weakness to reduce pain throughout pregnancy. Patient will continue to benefit from skilled PT services to modify and progress therapeutic interventions, address functional mobility deficits, address ROM deficits, address strength deficits, analyze and address soft tissue restrictions, analyze and cue movement patterns, analyze and modify body mechanics/ergonomics and assess and modify postural abnormalities to attain remaining goals. Progress towards goals / Updated goals: 
`.) Patient to present with = pelvic alignment 3 sessions in a row, indicating improved core strength. Not met, not level last 3 sessions  7.9/18 
   
· Long Term Goals: To be accomplished in  8-12  treatments: 
1) Patient to be independent & compliant with HEP in preparation for D/C. -UPDATED HEP 6/28/18 2)  Patient to increase FOTO score to 73 indicating improved functional abilities and QOL. Progressing 7/9/18 
3)  Patient will report decreased c/o pain to < or = 3/10 to facilitate ADLS and prolonged sitting/sleeping with manageable sx in Low back. Progressing 3/10 this session 7/9/18 
4.) Patient to increase core strength to 4+ globally to facilitate lumbo-pelvic stability and decreased sx/pain in SIJs.  NT see above 7/9/18 
5.) Assess patient's need for a SI belt, if indicated, for sx management during pregnancy. Pt denies at this time 7/9/18 PLAN [x]  Upgrade activities as tolerated     [x]  Continue plan of care 
[]  Update interventions per flow sheet      
[]  Discharge due to:_ 
[x]  Other:Check goals next session Sixto Armas DPT 7/30/2018  638 PM

## 2018-08-07 ENCOUNTER — APPOINTMENT (OUTPATIENT)
Dept: PHYSICAL THERAPY | Age: 23
End: 2018-08-07

## 2019-01-22 ENCOUNTER — DOCUMENTATION ONLY (OUTPATIENT)
Dept: SURGERY | Age: 24
End: 2019-01-22

## 2019-01-22 NOTE — LETTER
[facility] [place] [facility] 
 
 
Dear Patient, Your health is our main concern. It is important for your health to have follow-up lab work and to see you surgeon at 2 months, 4 months, 6 months, 9 months and annually after your weight loss surgery. Additionally, the Department of Bariatric Surgery at our hospital is a member of the [employer] [address] Surgical Quality Improvement Program ([facility] NSQIP). As a participant in this program, we gather information on the outcomes of our patients after surgery. Please call the office for a follow up appointment at [phone]. If you have moved out of the area or have changed surgeons please call us and let us know the name of your doctor. Your health and feedback are important to us. We greatly appreciate your response. Thank you, [facility] [address]

## 2019-01-22 NOTE — PROGRESS NOTES
Per [facility] requirements;  E-mail and letter sent for follow up appointment. [employer] [facility] [place]  [employer] [facility]  [facility]      Dear Patient,    Your health is our main concern. It is important for your health to have follow-up lab work and to see you surgeon at 2 months, 4 months, 6 months, 9 months and annually after your weight loss surgery. Additionally, the Department of Bariatric Surgery at our hospital is a member of the [employer] [address] Surgical Quality Improvement Program ([facility] NSQIP). As a participant in this program, we gather information on the outcomes of our patients after surgery. Please call the office for a follow up appointment at [phone]. If you have moved out of the area or have changed surgeons please call us and let us know the name of your doctor. Your health and feedback are important to us. We greatly appreciate your response.        Thank you,  [facility] [address] [facility]

## 2019-02-20 NOTE — PROGRESS NOTES
[address] PHYSICAL THERAPY AT [facility] [address]  Phone [phone]  Fax [phone] DISCHARGE SUMMARY Patient Name: Shelli Sanchez : 1995 Treatment/Medical Diagnosis: Low back pain [M54.5] Referral Source: Mary Warren NP Date of Initial Visit: 2018 Attended Visits: 8 Missed Visits: 4 SUMMARY OF TREATMENT Pt seen for 6 therapy sessions for LBP with pregnancy. Therapy has included strengthening for stabilization and manual to correct SI alignment with MET. CURRENT STATUS Pt was last seen on 18 for low back pain. She failed to make further FU visits and therefore, is to be DC'd secondary to non-compliance. Formal assessment not performed due to unplanned DC. RECOMMENDATIONS Discontinue therapy due to lack of attendance or compliance. If you have any questions/comments please contact us directly at [phone]. Thank you for allowing us to assist in the care of your patient. Therapist Signature: Kristofer Cardona DPT Date: 19   Time: 3:09 PM

## 2020-01-28 ENCOUNTER — DOCUMENTATION ONLY (OUTPATIENT)
Dept: SURGERY | Age: 25
End: 2020-01-28

## 2020-01-28 NOTE — LETTER
[facility] [place] [facility] 
 
 
Dear Patient, Your health is our main concern. It is important for your health to have follow-up lab work and to see you surgeon at 2 months, 4 months, 6 months, 9 months and annually after your weight loss surgery. Additionally, the Department of Bariatric Surgery at our hospital is a member of the [employer] [address] Surgical Quality Improvement Program ([facility] NSQIP). As a participant in this program, we gather information on the outcomes of our patients after surgery. Please call the office for a follow up appointment at [phone]. If you have moved out of the area or have changed surgeons please call us and let us know the name of your doctor. Your health and feedback are important to us. We greatly appreciate your response. Thank you, [facility] [place] [facility] [address]

## 2020-01-28 NOTE — PROGRESS NOTES
Per [facility] requirements;  E-mail and letter sent for follow up appointment. [facility] [place]  [facility]  [facility]      Dear Patient,    Your health is our main concern. It is important for your health to have follow-up lab work and to see you surgeon at 2 months, 4 months, 6 months, 9 months and annually after your weight loss surgery. Additionally, the Department of Bariatric Surgery at our hospital is a member of the [employer] [address] Surgical Quality Improvement Program ([facility] NSQIP). As a participant in this program, we gather information on the outcomes of our patients after surgery. Please call the office for a follow up appointment at [phone]. If you have moved out of the area or have changed surgeons please call us and let us know the name of your doctor. Your health and feedback are important to us. We greatly appreciate your response.        Thank you,  [facility] [address] [facility]

## 2022-05-23 ENCOUNTER — HOSPITAL ENCOUNTER (OUTPATIENT)
Dept: PHYSICAL THERAPY | Age: 27
Discharge: HOME | End: 2022-05-23
Payer: OTHER GOVERNMENT

## 2022-05-23 PROCEDURE — 97110 THERAPEUTIC EXERCISES: CPT

## 2022-05-23 PROCEDURE — 97162 PT EVAL MOD COMPLEX 30 MIN: CPT

## 2022-05-23 NOTE — PROGRESS NOTES
[address] PHYSICAL THERAPY AT [facility]  [address] [facility] [address] [place] [address], [address] Lemos  Phone [phone]  Fax [phone] / [address] [place] [address] PHYSICAL THERAPY SERVICES  Patient Name: Terese Hubbard : 1995   Medical   Diagnosis: Left foot pain [M79.672] Treatment Diagnosis: Left foot pain   Onset Date: 2022     Referral Source: Shania Rico MD Start of Care [facility]): 2022   Prior Hospitalization: See medical history Provider #: [id number]   Prior Level of Function: Functionally IND, required to walk often for work    Comorbidities: None reported    Medications: Verified on Patient Summary List   The Plan of Care and following information is based on the information from the initial evaluation.   =================================================================================  Assessment / key information:    Subjective functional status/changes: The patient presents with left foot/ankle pain that started about 4 weeks ago when she reached for a falling basket while by the stairs and heard pops. The patient states her foot instantly bruised. She presents wearing CAM boot, she states one doctor told her that her foot was broken, two other doctors say there is just a sprain on the outside of her ankle. She reports constant pain, average 3-5/10, pain at worst 7-8/10. She reports increased swelling, unable to use ice due to reaction of hives. The patient reports she is using ibuprofen and tylenol for swelling and pain management. 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 The patient reports some fear of falling. The patient is required to get onto the floor to play with her 1year old and take care of pets. She reports occasional numbness in her foot with sitting on the floor. She was told by MD to avoid stairs. FOTO: 55/100.        Other Objective/Functional Measures:        Fall Risk Assessment: Does the patient have a fear of falling? YES If yes, what fall risk assessment was performed? 5 time sit to stand: 18 seconds, increased left hip ER, decreased WB through LLE, increased left knee valgus  Gait: CAM boot on LLE, decreased step length      Knee strength:   Right: 4-/5 globally   Left: 4-/5 globally      Hip strength:   Right: flexion 4/5  Left: flexion 4/5     Ankle strength:   Right: 4/5 globally    Left: 4-/5 globally  P! With all resisted motions      Ankle ROM:   Right: DF 10 deg, PF 50 deg, INV 35 deg, EVR 35 deg   Left: DF 10/12 deg A/PROM, PF 35 deg, INV 10 deg p!, EVR 5 deg       SIJ: right posterior innominate rotation corrected with METS      Palpation: TTP lateral left ankle ligaments, metatarsals 3-5 distally      Pain Level (0-10 scale) post treatment: 5/10      ASSESSMENT/Changes in Function:   Upon evaluation, the patient presents with c/o left foot and ankle pain. She presents wearing CAM boot on L foot. Limited functional mobility and strength globally. Noted increased tenderness along the lateral ligaments of the left ankle. Provided initial HEP. Educated patient on safety with transfers and ambulation, elevation for swelling management, and compliance with HEP.  The patient will benefit from skilled PT to address above limitations and improve QoL.     =================================================================================  Eval Complexity: History: MEDIUM  Complexity : 1-2 comorbidities / personal factors will impact the outcome/ POC Exam:MEDIUM Complexity : 3 Standardized tests and measures addressing body structure, function, activity limitation and / or participation in recreation  Presentation: MEDIUM Complexity : Evolving with changing characteristics  Clinical Decision Making:MEDIUM Complexity : FOTO score of 26-74Overall Complexity:MEDIUM  Problem List: pain affecting function, decrease ROM, decrease strength, edema affecting function, impaired gait/ balance, decrease ADL/ functional abilitiies, decrease activity tolerance and decrease flexibility/ joint mobility   Treatment Plan may include any combination of the following: Therapeutic exercise, Therapeutic activities, Neuromuscular re-education, Physical agent/modality, Gait/balance training, Manual therapy and Patient education  Patient / Family readiness to learn indicated by: asking questions  Persons(s) to be included in education: patient (P)  Barriers to Learning/Limitations: None  Measures taken:    Patient Goal (s): Mobilization    Patient self reported health status: good  Rehabilitation Potential: good    Short term goals to be accomplished in 4 visits:   1. The pt will be IND and compliant with HEP and self management of symptoms. 2. The patient will report pain average of </= 3/10 as an indicator of improved QoL.      Long term goals to be accomplished in 12 visits:   1. The patient will improve left ankle strength to 4+/5 globally to improve her walking tolerance. 2. The patient will improve left ankle INV AROM to 35 deg and EVR AROM to 15 deg for improved gait mechanics. 3. The patient will improve B/L knee strength globally to 5/5 for improved walking tolerance. 4. The patient will improve B/L hip flexion strength to 5/5 for improved tolerance to performing work duties.   5. The pt will improve FOTO score to 76/100 as a functional indicator of improved mobility.         Frequency / Duration:   Patient to be seen  2  times per week for 12  treatments: (All LTG as above will be assessed and updated every 10 visits or 30 days and progressed as needed)    Patient / Caregiver education and instruction: exercises  Therapist Signature: Eron Roper PT Date: 7/51/7878   Certification Period: N/A Time: 10:58 AM   ===========================================================================================  I certify that the above Physical Therapy Services are being furnished while the patient is under my care. I agree with the treatment plan and certify that this therapy is necessary. Physician Signature:        Date:       Time:     Please sign and return to [facility] or you may fax the signed copy to [phone]. Thank you.   Dwayne Arguelles MD

## 2022-05-23 NOTE — PROGRESS NOTES
PT DAILY TREATMENT NOTE     Patient Name: Jo Ann Meter  Date:2022  : 1995  [x]  Patient  Verified  Payor:  / Plan: Laith Shear / Product Type:  /    In time:10:05  Out time:10:40  Total Treatment Time (min): 35  Visit #: 1 of 12    Medicare/[facility] Only   Total Timed Codes (min):  x 1:1 Treatment Time:  x       Treatment Area: Left foot pain [M79.672]    SUBJECTIVE  Pain Level (0-10 scale): 5/10  Any medication changes, allergies to medications, adverse drug reactions, diagnosis change, or new procedure performed?: [x] No    [] Yes (see summary sheet for update)  Subjective functional status/changes:   [] No changes reported  The patient presents with left foot/ankle pain that started about 4 weeks ago when she reached for a falling basket while by the stairs and heard pops. The patient states her foot instantly bruised. She presents wearing CAM boot, she states one doctor told her that her foot was broken, two other doctors say there is just a sprain on the outside of her ankle. She reports constant pain, average 3-5/10, pain at worst 7-8/10. She reports increased swelling, unable to use ice due to reaction of hives. The patient reports she is using ibuprofen and tylenol for swelling and pain management. 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 The patient reports some fear of falling. The patient is required to get onto the floor to play with her 1year old and take care of pets. She reports occasional numbness in her foot with sitting on the floor. She was told by MD to avoid stairs. FOTO: 55/100.      OBJECTIVE    Modality rationale: decrease inflammation and decrease pain to improve the patients ability to perform work duties    Min Type Additional Details    [] Estim:  []Unatt       []IFC  []Premod                        []Other:  []w/ice   []w/heat  Position:  Location: [] Estim: []Att    []TENS instruct  []NMES                    []Other:  []w/US   []w/ice   []w/heat  Position:  Location:    []  Traction: [] Cervical       []Lumbar                       [] Prone          []Supine                       []Intermittent   []Continuous Lbs:  [] before manual  [] after manual    []  Ultrasound: []Continuous   [] Pulsed                           []1MHz   []3MHz W/cm2:  Location:    []  Iontophoresis with dexamethasone         Location: [] Take home patch   [] In clinic    []  Ice     []  heat  []  Ice massage  []  Laser   []  Anodyne Position:  Location:    []  Laser with stim  []  Other:  Position:  Location:    []  Vasopneumatic Device  Pre-treatment girth:   Post-treatment girth:   Measured at landmark location:  Pressure:       [] lo [] med [] hi   Temperature: [] lo [] med [] hi   [] Skin assessment post-treatment:  []intact []redness- no adverse reaction    []redness - adverse reaction:   Vasopnuematic compression justification:  Per bilateral girth measures taken and listed above the edema is considered significant and having an impact on the patient's strength and balance    12 min [x]Eval                  []Re-Eval       23 min Therapeutic Exercise:  [x] See flow sheet :  HEP Development and instruction  Patient education: safety with transfers, elevation for swelling management, safety with ambulation using CAM boot   Rationale: increase ROM and increase strength to improve the patients ability to perform household duties     x min Therapeutic Activity:  []  See flow sheet :   Rationale: increase ROM and increase strength  to improve the patients ability to perform work duties      x min Neuromuscular Re-education:  []  See flow sheet :   Rationale: increase ROM and increase strength  to improve the patients gait stability    x min Manual Therapy:      The manual therapy interventions were performed at a separate and distinct time from the therapeutic activities interventions. Rationale: decrease pain and increase ROM to improve her gait mechanics          With   [] TE   [] TA   [] neuro   [] other: Patient Education: [x] Review HEP    [] Progressed/Changed HEP based on:   [] positioning   [] body mechanics   [] transfers   [] heat/ice application    [] other:      Other Objective/Functional Measures:    Fall Risk Assessment: Does the patient have a fear of falling? YES If yes, what fall risk assessment was performed? 5 time sit to stand: 18 seconds, increased left hip ER, decreased WB through LLE, increased left knee valgus  Gait: CAM boot on LLE, decreased step length     Knee strength:   Right: 4-/5 globally   Left: 4-/5 globally     Hip strength:   Right: flexion 4/5  Left: flexion 4/5    Ankle strength:   Right: 4/5 globally    Left: 4-/5 globally  P! With all resisted motions     Ankle ROM:   Right: DF 10 deg, PF 50 deg, INV 35 deg, EVR 35 deg   Left: DF 10/12 deg A/PROM, PF 35 deg, INV 10 deg p!, EVR 5 deg      SIJ: right posterior innominate rotation corrected with METS     Palpation: TTP lateral left ankle ligaments, metatarsals 3-5 distally     Pain Level (0-10 scale) post treatment: 5/10     ASSESSMENT/Changes in Function:   Upon evaluation, the patient presents with c/o left foot and ankle pain. She presents wearing CAM boot on L foot. Limited functional mobility and strength globally. Noted increased tenderness along the lateral ligaments of the left ankle. Provided initial HEP. Educated patient on safety with transfers and ambulation, elevation for swelling management, and compliance with HEP. The patient will benefit from skilled PT to address above limitations and improve QoL. Patient will continue to benefit from skilled PT services to modify and progress therapeutic interventions, address functional mobility deficits, address ROM deficits and address strength deficits to attain remaining goals.      [x]  See Plan of Care  []  See progress note/recertification  []  See Discharge Summary         Progress towards goals / Updated goals:  Short term goals to be accomplished in 4 visits:   1. The pt will be IND and compliant with HEP and self management of symptoms. 2. The patient will report pain average of </= 3/10 as an indicator of improved QoL. Long term goals to be accomplished in 12 visits:   1. The patient will improve left ankle strength to 4+/5 globally to improve her walking tolerance. 2. The patient will improve left ankle INV AROM to 35 deg and EVR AROM to 15 deg for improved gait mechanics. 3. The patient will improve B/L knee strength globally to 5/5 for improved walking tolerance. 4. The patient will improve B/L hip flexion strength to 5/5 for improved tolerance to performing work duties. 5. The pt will improve FOTO score to 76/100 as a functional indicator of improved mobility.       PLAN  []  Upgrade activities as tolerated     []  Continue plan of care  []  Update interventions per flow sheet       []  Discharge due to:_  [x]  Other: begin treatment 2x/week for 12 visits       Justification for Eval Code Complexity:  Patient History : hx of injury 4 weeks ago   Examination see exam   Clinical Presentation: evolving with changing characteristics   Clinical Decision Making : FOTO : 54 /100     Merline Boss, PT 5/23/2022  10:09 AM    Future Appointments   Date Time Provider Karely Sloan   5/25/2022  4:15 PM Erik Barragan, DPT [facility]   6/2/2022  5:00 PM Dusty Benitez [facility]   6/6/2022  3:30 PM Carlosferd Laurel, DPT [facility]   6/13/2022  3:30 PM Erik Barragan, DPT [facility]   6/15/2022  3:30 PM Carlosferwesley Barragan, DPT [facility]   6/20/2022  3:30 PM Erik Barragan, DPT [facility]   6/22/2022  3:30 PM Erik Barragan, [facility]   6/27/2022  3:30 PM Erik Barragan, [facility]   6/29/2022  3:30 PM Erik Barragan, [facility]

## 2022-05-25 ENCOUNTER — HOSPITAL ENCOUNTER (OUTPATIENT)
Dept: PHYSICAL THERAPY | Age: 27
Discharge: HOME | End: 2022-05-25
Payer: OTHER GOVERNMENT

## 2022-05-25 PROCEDURE — 97035 APP MDLTY 1+ULTRASOUND EA 15: CPT | Performed by: PHYSICAL THERAPIST

## 2022-05-25 PROCEDURE — 97112 NEUROMUSCULAR REEDUCATION: CPT | Performed by: PHYSICAL THERAPIST

## 2022-05-25 PROCEDURE — 97110 THERAPEUTIC EXERCISES: CPT | Performed by: PHYSICAL THERAPIST

## 2022-05-25 NOTE — PROGRESS NOTES
PT DAILY TREATMENT NOTE 11    Patient Name: Sushil Sanon  Date:2022  : 1995  [x]  Patient  Verified  Payor:  / Plan: José Manuel Silveira 73 / Product Type:  /    In time:417 Out time:515  Total Treatment Time (min): 62  Visit #: 2 of 12    Treatment Area: Left foot pain [M79.672]    SUBJECTIVE  Pain Level (0-10 scale): 4-510  Any medication changes, allergies to medications, adverse drug reactions, diagnosis change, or new procedure performed?: [x] No    [] Yes (see summary sheet for update)  Subjective functional status/changes:   [] No changes reported  Pt retold subjective history.      OBJECTIVE    Modality rationale: decrease inflammation and decrease pain to improve the patients ability to perform work duties    Min Type Additional Details    [] Estim:  []Unatt       []IFC  []Premod                        []Other:  []w/ice   []w/heat  Position:  Location:    [] Estim: []Att    []TENS instruct  []NMES                    []Other:  []w/US   []w/ice   []w/heat  Position:  Location:    []  Traction: [] Cervical       []Lumbar                       [] Prone          []Supine                       []Intermittent   []Continuous Lbs:  [] before manual  [] after manual   8 [x]  Ultrasound: []Continuous   [x] Pulsed                           [x]1MHz   []3MHz W/cm2: 1.0  Location: l lateral ankle    []  Iontophoresis with dexamethasone         Location: [] Take home patch   [] In clinic    []  Ice     []  heat  []  Ice massage  []  Laser   []  Anodyne Position:  Location:    []  Laser with stim  []  Other:  Position:  Location:    []  Vasopneumatic Device  Pre-treatment girth:   Post-treatment girth:   Measured at landmark location:  Pressure:       [] lo [] med [] hi   Temperature: [] lo [] med [] hi   [] Skin assessment post-treatment:  []intact []redness- no adverse reaction    []redness - adverse reaction:   Vasopnuematic compression justification:  Per bilateral girth measures taken and listed above the edema is considered significant and having an impact on the patient's strength and balance    40 min Therapeutic Exercise:  [x] See flow sheet :  Bike  Seated gastroc stretch  Seated HR/TR  4 way ankle   Rationale: increase ROM and increase strength to improve the patients ability to perform household duties     x min Therapeutic Activity:  []  See flow sheet :   Rationale: increase ROM and increase strength  to improve the patients ability to perform work duties      10 min Neuromuscular Re-education:  []  See flow sheet :  Toe yoga  Towel scrunches     Rationale: increase ROM and increase strength  to improve the patients gait stability    x min Manual Therapy: The manual therapy interventions were performed at a separate and distinct time from the therapeutic activities interventions. Rationale: decrease pain and increase ROM to improve her gait mechanics          With   [] TE   [] TA   [] neuro   [] other: Patient Education: [x] Review HEP    [] Progressed/Changed HEP based on:   [] positioning   [] body mechanics   [] transfers   [] heat/ice application    [] other:      Other Objective/Functional Measures:   Pain with AROM of the L ankle in all planes- unable to perform L ankle EV/PF with YTB  SI level      Pain Level (0-10 scale) post treatment: 3/10     ASSESSMENT/Changes in Function:   Pt tolerated initiation of therex with increased pain/discomfort. Added pulsed US over the L lateral ankle/ligaments to promote heeling. Assess effects of treatment NV. Patient will continue to benefit from skilled PT services to modify and progress therapeutic interventions, address functional mobility deficits, address ROM deficits and address strength deficits to attain remaining goals. [x]  See Plan of Care  []  See progress note/recertification  []  See Discharge Summary         Progress towards goals / Updated goals:  Short term goals to be accomplished in 4 visits:   1.  The pt will be IND and compliant with HEP and self management of symptoms. 2. The patient will report pain average of </= 3/10 as an indicator of improved QoL. Long term goals to be accomplished in 12 visits:   1. The patient will improve left ankle strength to 4+/5 globally to improve her walking tolerance. 2. The patient will improve left ankle INV AROM to 35 deg and EVR AROM to 15 deg for improved gait mechanics. 3. The patient will improve B/L knee strength globally to 5/5 for improved walking tolerance. 4. The patient will improve B/L hip flexion strength to 5/5 for improved tolerance to performing work duties. 5. The pt will improve FOTO score to 76/100 as a functional indicator of improved mobility.       PLAN  [x]  Upgrade activities as tolerated     [x]  Continue plan of care  []  Update interventions per flow sheet       []  Discharge due to:_  [x]  Other: check goals WILLIE Connelly DPT 5/25/2022  520 PM    Future Appointments   Date Time Provider Karely Sloan   6/2/2022  5:00 PM Janny Dangeloach [facility]   6/6/2022  3:30 PM Kali Rommel, DPT [facility]   6/8/2022  3:30 PM Kali Rommel, DPT [facility]   6/13/2022  3:30 PM Kali Rommel, DPT [facility]   6/15/2022  3:30 PM Kali Hutch, DPT [facility]   6/20/2022  3:30 PM Kali Ronaldch, DPT [facility]   6/22/2022  3:30 PM Kali Ronaldch, DPT [facility]   6/27/2022  3:30 PM Kali Ronaldch, DPT [facility]   6/29/2022  3:30 PM [facility]

## 2022-06-06 ENCOUNTER — HOSPITAL ENCOUNTER (OUTPATIENT)
Dept: PHYSICAL THERAPY | Age: 27
Discharge: HOME | End: 2022-06-06
Payer: OTHER GOVERNMENT

## 2022-06-06 PROCEDURE — 97112 NEUROMUSCULAR REEDUCATION: CPT | Performed by: PHYSICAL THERAPIST

## 2022-06-06 PROCEDURE — 97035 APP MDLTY 1+ULTRASOUND EA 15: CPT | Performed by: PHYSICAL THERAPIST

## 2022-06-06 NOTE — PROGRESS NOTES
PT DAILY TREATMENT NOTE     Patient Name: Pako Mike  Date:2022  : 1995  [x]  Patient  Verified  Payor:  / Plan: José Manuel Silveira 73 / Product Type:  /    In time:338 Out time:429  Total Treatment Time (min): 51  Visit #: 3 of 12    Treatment Area: Left foot pain [M79.732]    SUBJECTIVE  Pain Level (0-10 scale): 2-310  Any medication changes, allergies to medications, adverse drug reactions, diagnosis change, or new procedure performed?: [x] No    [] Yes (see summary sheet for update)  Subjective functional status/changes:   [] No changes reported  Pt reports that after her last session she felt good but then she stood on her foot all day yesterday and she heard a pop and now that her ankle is sore. Pt reports compliance with HEP.      OBJECTIVE    Modality rationale: decrease inflammation and decrease pain to improve the patients ability to perform work duties    Min Type Additional Details    [] Estim:  []Unatt       []IFC  []Premod                        []Other:  []w/ice   []w/heat  Position:  Location:    [] Estim: []Att    []TENS instruct  []NMES                    []Other:  []w/US   []w/ice   []w/heat  Position:  Location:    []  Traction: [] Cervical       []Lumbar                       [] Prone          []Supine                       []Intermittent   []Continuous Lbs:  [] before manual  [] after manual   8 [x]  Ultrasound: []Continuous   [x] Pulsed                           [x]1MHz   []3MHz W/cm2: 1.0  Location: l lateral ankle    []  Iontophoresis with dexamethasone         Location: [] Take home patch   [] In clinic   10 [x]  Ice     []  heat  []  Ice massage  []  Laser   []  Anodyne Position: semi-reclined  Location: L ankle    []  Laser with stim  []  Other:  Position:  Location:    []  Vasopneumatic Device  Pre-treatment girth:   Post-treatment girth:   Measured at landmark location:  Pressure:       [] lo [] med [] hi   Temperature: [] lo [] med [] hi   [] Skin assessment post-treatment:  []intact []redness- no adverse reaction    []redness - adverse reaction:   Vasopnuematic compression justification:  Per bilateral girth measures taken and listed above the edema is considered significant and having an impact on the patient's strength and balance    H min Therapeutic Exercise:  [x] See flow sheet :     Rationale: increase ROM and increase strength to improve the patients ability to perform household duties     x min Therapeutic Activity:  []  See flow sheet :   Rationale: increase ROM and increase strength  to improve the patients ability to perform work duties      33 min Neuromuscular Re-education:  []  See flow sheet :  Bike  Toe yoga  Towel scrunches  Marbles     Rationale: increase ROM and increase strength  to improve the patients gait stability    x min Manual Therapy: The manual therapy interventions were performed at a separate and distinct time from the therapeutic activities interventions. Rationale: decrease pain and increase ROM to improve her gait mechanics          With   [] TE   [] TA   [] neuro   [] other: Patient Education: [x] Review HEP    [] Progressed/Changed HEP based on:   [] positioning   [] body mechanics   [] transfers   [] heat/ice application    [] other:      Other Objective/Functional Measures:   Increased pain with seated active DF  Held 4 way ankle secondary to increased pain     Pain Level (0-10 scale) post treatment: 3-4 /10     ASSESSMENT/Changes in Function:   Pt reports reduced pain in her L foot and ankle after treatment. Held progression of therex due to increased pain levels. Pt did speak to her MD regarding the incident. Will FU with MD if pain continues to progress throughout the week. Patient will continue to benefit from skilled PT services to modify and progress therapeutic interventions, address functional mobility deficits, address ROM deficits and address strength deficits to attain remaining goals.      [x] See Plan of Care  []  See progress note/recertification  []  See Discharge Summary         Progress towards goals / Updated goals:  Short term goals to be accomplished in 4 visits:   1. The pt will be IND and compliant with HEP and self management of symptoms. Pt reports compliance with HEP 6/6/22  2. The patient will report pain average of </= 3/10 as an indicator of improved QoL. Increased pain this session due overuse yesterday 6/6/22    Long term goals to be accomplished in 12 visits:   1. The patient will improve left ankle strength to 4+/5 globally to improve her walking tolerance. 2. The patient will improve left ankle INV AROM to 35 deg and EVR AROM to 15 deg for improved gait mechanics. 3. The patient will improve B/L knee strength globally to 5/5 for improved walking tolerance. 4. The patient will improve B/L hip flexion strength to 5/5 for improved tolerance to performing work duties. 5. The pt will improve FOTO score to 76/100 as a functional indicator of improved mobility.       PLAN  [x]  Upgrade activities as tolerated     [x]  Continue plan of care  []  Update interventions per flow sheet       []  Discharge due to:_  [x]  Other: check goals NV, progress as tolerated       Anton Mcgrath DPT 6/6/2022  437 PM    Future Appointments   Date Time Provider Karely Sloan   6/8/2022  3:30 PM Lennox Phillips, [facility]   6/13/2022  3:30 PM Lennox Phillips, MIKI [facility]   6/15/2022  3:30 PM Lennox Phillips, MIKI [facility]   6/20/2022  3:30 PM Lennox Phillips, MIKI [facility]   6/22/2022  3:30 PM Lennox Phillips, MIKI [facility]   6/27/2022  3:30 PM Lennox Phillips, MIKI [facility]   6/29/2022  3:30 PM Lennox Phillips, [facility]

## 2022-06-08 ENCOUNTER — HOSPITAL ENCOUNTER (OUTPATIENT)
Dept: PHYSICAL THERAPY | Age: 27
Discharge: HOME | End: 2022-06-08
Payer: OTHER GOVERNMENT

## 2022-06-08 PROCEDURE — 97112 NEUROMUSCULAR REEDUCATION: CPT | Performed by: PHYSICAL THERAPIST

## 2022-06-08 PROCEDURE — 97035 APP MDLTY 1+ULTRASOUND EA 15: CPT | Performed by: PHYSICAL THERAPIST

## 2022-06-08 NOTE — PROGRESS NOTES
PT DAILY TREATMENT NOTE     Patient Name: Scott Morejon  Date:2022  : 1995  [x]  Patient  Verified  Payor: ABRIL / Plan: José Manuel Silveira 73 / Product Type:  /    In time:342 Out time: 429  Total Treatment Time (min): 52  Visit #: 4  of 12    Treatment Area: Left foot pain [M79.672]    SUBJECTIVE  Pain Level (0-10 scale): 3-410  Any medication changes, allergies to medications, adverse drug reactions, diagnosis change, or new procedure performed?: [x] No    [] Yes (see summary sheet for update)  Subjective functional status/changes:   [] No changes reported  Pt reports that she felt better after last session. She continues to have pain with weigh-bearing activities.      OBJECTIVE    Modality rationale: decrease inflammation and decrease pain to improve the patients ability to perform work duties    Min Type Additional Details    [] Estim:  []Unatt       []IFC  []Premod                        []Other:  []w/ice   []w/heat  Position:  Location:    [] Estim: []Att    []TENS instruct  []NMES                    []Other:  []w/US   []w/ice   []w/heat  Position:  Location:    []  Traction: [] Cervical       []Lumbar                       [] Prone          []Supine                       []Intermittent   []Continuous Lbs:  [] before manual  [] after manual   8 [x]  Ultrasound: []Continuous   [x] Pulsed                           [x]1MHz   []3MHz W/cm2: 1.0  Location: l lateral ankle    []  Iontophoresis with dexamethasone         Location: [] Take home patch   [] In clinic   10 [x]  Ice     []  heat  []  Ice massage  []  Laser   []  Anodyne Position: semi-reclined  Location: L ankle    []  Laser with stim  []  Other:  Position:  Location:    []  Vasopneumatic Device  Pre-treatment girth:   Post-treatment girth:   Measured at landmark location:  Pressure:       [] lo [] med [] hi   Temperature: [] lo [] med [] hi   [] Skin assessment post-treatment:  []intact []redness- no adverse reaction []redness - adverse reaction:   Vasopnuematic compression justification:  Per bilateral girth measures taken and listed above the edema is considered significant and having an impact on the patient's strength and balance    H min Therapeutic Exercise:  [x] See flow sheet :     Rationale: increase ROM and increase strength to improve the patients ability to perform household duties     x min Therapeutic Activity:  []  See flow sheet :   Rationale: increase ROM and increase strength  to improve the patients ability to perform work duties      29 min Neuromuscular Re-education:  []  See flow sheet :  Bike  Toe yoga  Towel scrunches  Marbles     Rationale: increase ROM and increase strength  to improve the patients gait stability    x min Manual Therapy: The manual therapy interventions were performed at a separate and distinct time from the therapeutic activities interventions. Rationale: decrease pain and increase ROM to improve her gait mechanics          With   [] TE   [] TA   [] neuro   [] other: Patient Education: [x] Review HEP    [] Progressed/Changed HEP based on:   [] positioning   [] body mechanics   [] transfers   [] heat/ice application    [] other:      Other Objective/Functional Measures:   Increased pain in the L ankle with AROM of the foot. Pain Level (0-10 scale) post treatment: 2-3 /10     ASSESSMENT/Changes in Function:   Pt continues to be limited on progression secondary to increased pain most likely from overdoing it over the weekend. Continue to progress as tolerated. Patient will continue to benefit from skilled PT services to modify and progress therapeutic interventions, address functional mobility deficits, address ROM deficits and address strength deficits to attain remaining goals. [x]  See Plan of Care  []  See progress note/recertification  []  See Discharge Summary         Progress towards goals / Updated goals:  Short term goals to be accomplished in 4 visits:   1. The pt will be IND and compliant with HEP and self management of symptoms. Pt reports compliance with HEP 6/6/22  2. The patient will report pain average of </= 3/10 as an indicator of improved QoL. Increased pain this session due overuse yesterday 6/6/22    Long term goals to be accomplished in 12 visits:   1. The patient will improve left ankle strength to 4+/5 globally to improve her walking tolerance. 2. The patient will improve left ankle INV AROM to 35 deg and EVR AROM to 15 deg for improved gait mechanics. 3. The patient will improve B/L knee strength globally to 5/5 for improved walking tolerance. 4. The patient will improve B/L hip flexion strength to 5/5 for improved tolerance to performing work duties. 5. The pt will improve FOTO score to 76/100 as a functional indicator of improved mobility.       PLAN  [x]  Upgrade activities as tolerated     [x]  Continue plan of care  []  Update interventions per flow sheet       []  Discharge due to:_  [x]  Other: check goals NV      Anton Mcgrath DPT 6/8/2022  423   PM    Future Appointments   Date Time Provider Karely Sloan   6/13/2022  3:30 PM Lennox Phillips, [facility]   6/15/2022  3:30 PM Lennox Phillpis, [facility]   6/20/2022  3:30 PM Lennox Phillips, [facility]   6/22/2022  3:30 PM Lennox Phillips, [facility]   6/27/2022  3:30 PM Lennox Phillips, [facility]   6/29/2022  3:30 PM Lennox Phillips, [facility]

## 2022-06-13 ENCOUNTER — HOSPITAL ENCOUNTER (OUTPATIENT)
Dept: PHYSICAL THERAPY | Age: 27
Discharge: HOME | End: 2022-06-13
Payer: OTHER GOVERNMENT

## 2022-06-13 PROCEDURE — 97035 APP MDLTY 1+ULTRASOUND EA 15: CPT | Performed by: PHYSICAL THERAPIST

## 2022-06-13 PROCEDURE — 97530 THERAPEUTIC ACTIVITIES: CPT | Performed by: PHYSICAL THERAPIST

## 2022-06-13 NOTE — PROGRESS NOTES
PT DAILY TREATMENT NOTE     Patient Name: Yessenia Najera  Date:2022  : 1995  [x]  Patient  Verified  Payor:  / Plan: José Manuel Silveira 73 / Product Type:  /    In time:340 Out time: 439  Total Treatment Time (min): 59  Visit #: 5 of 12    Treatment Area: Left foot pain [M79.732]    SUBJECTIVE  Pain Level (0-10 scale): 2-3/10  Any medication changes, allergies to medications, adverse drug reactions, diagnosis change, or new procedure performed?: [x] No    [] Yes (see summary sheet for update)  Subjective functional status/changes:   [] No changes reported  Pt reports that she is back to her baseline soreness as she rested her ankle for the weekend.      OBJECTIVE    Modality rationale: decrease inflammation and decrease pain to improve the patients ability to perform work duties    Min Type Additional Details    [] Estim:  []Unatt       []IFC  []Premod                        []Other:  []w/ice   []w/heat  Position:  Location:    [] Estim: []Att    []TENS instruct  []NMES                    []Other:  []w/US   []w/ice   []w/heat  Position:  Location:    []  Traction: [] Cervical       []Lumbar                       [] Prone          []Supine                       []Intermittent   []Continuous Lbs:  [] before manual  [] after manual   8 [x]  Ultrasound: []Continuous   [x] Pulsed                           [x]1MHz   []3MHz W/cm2: 1.0  Location: l lateral ankle    []  Iontophoresis with dexamethasone         Location: [] Take home patch   [] In clinic   10 [x]  Ice     []  heat  []  Ice massage  []  Laser   []  Anodyne Position: semi-reclined  Location: L ankle    []  Laser with stim  []  Other:  Position:  Location:    []  Vasopneumatic Device  Pre-treatment girth:   Post-treatment girth:   Measured at landmark location:  Pressure:       [] lo [] med [] hi   Temperature: [] lo [] med [] hi   [] Skin assessment post-treatment:  []intact []redness- no adverse reaction    []redness - adverse reaction:   Vasopnuematic compression justification:  Per bilateral girth measures taken and listed above the edema is considered significant and having an impact on the patient's strength and balance    H min Therapeutic Exercise:  [x] See flow sheet :     Rationale: increase ROM and increase strength to improve the patients ability to perform household duties     x min Therapeutic Activity:  []  See flow sheet :   Rationale: increase ROM and increase strength  to improve the patients ability to perform work duties      41 min Neuromuscular Re-education:  []  See flow sheet :  Bike  Toe yoga  Towel scrunches  Marbles  4 way ankle     Rationale: increase ROM and increase strength  to improve the patients gait stability    x min Manual Therapy: The manual therapy interventions were performed at a separate and distinct time from the therapeutic activities interventions. Rationale: decrease pain and increase ROM to improve her gait mechanics          With   [] TE   [] TA   [] neuro   [] other: Patient Education: [x] Review HEP    [] Progressed/Changed HEP based on:   [] positioning   [] body mechanics   [] transfers   [] heat/ice application    [] other:      Other Objective/Functional Measures:   Increased pain with toe raises    Pain Level (0-10 scale) post treatment: 2-3 /10     ASSESSMENT/Changes in Function:   Improved tolerance with increased eased of the therex. Trial NV with wearing shoe to attempt standing exercises out of boot. Patient will continue to benefit from skilled PT services to modify and progress therapeutic interventions, address functional mobility deficits, address ROM deficits and address strength deficits to attain remaining goals. [x]  See Plan of Care  []  See progress note/recertification  []  See Discharge Summary         Progress towards goals / Updated goals:  Short term goals to be accomplished in 4 visits:   1.  The pt will be IND and compliant with HEP and self management of symptoms. Pt reports compliance with HEP 6/6/22  2. The patient will report pain average of </= 3/10 as an indicator of improved QoL. Increased pain this session due overuse yesterday 6/6/22    Long term goals to be accomplished in 12 visits:   1. The patient will improve left ankle strength to 4+/5 globally to improve her walking tolerance. Added 4 way ankle back to program 6/13/22  2. The patient will improve left ankle INV AROM to 35 deg and EVR AROM to 15 deg for improved gait mechanics. 3. The patient will improve B/L knee strength globally to 5/5 for improved walking tolerance. 4. The patient will improve B/L hip flexion strength to 5/5 for improved tolerance to performing work duties. 5. The pt will improve FOTO score to 76/100 as a functional indicator of improved mobility.       PLAN  [x]  Upgrade activities as tolerated     [x]  Continue plan of care  []  Update interventions per flow sheet       []  Discharge due to:_  [x]  Other: check goals WILLIE Mensha DPT 6/13/2022  446   PM    Future Appointments   Date Time Provider Karely Sloan   6/15/2022  3:30 PM Joe Carnes DPT [facility]   6/20/2022  3:30 PM Joe Carnes DPT [facility]   6/22/2022  3:30 PM Joe Carnes DPT [facility]   6/27/2022  3:30 PM Joe Carnes DPT [facility]   6/29/2022  3:30 PM Joe Carnes DPT [facility]

## 2022-06-15 ENCOUNTER — HOSPITAL ENCOUNTER (OUTPATIENT)
Dept: PHYSICAL THERAPY | Age: 27
Discharge: HOME | End: 2022-06-15
Payer: OTHER GOVERNMENT

## 2022-06-15 PROCEDURE — 97112 NEUROMUSCULAR REEDUCATION: CPT | Performed by: PHYSICAL THERAPIST

## 2022-06-15 PROCEDURE — 97530 THERAPEUTIC ACTIVITIES: CPT | Performed by: PHYSICAL THERAPIST

## 2022-06-15 PROCEDURE — 97035 APP MDLTY 1+ULTRASOUND EA 15: CPT | Performed by: PHYSICAL THERAPIST

## 2022-06-15 NOTE — PROGRESS NOTES
PT DAILY TREATMENT NOTE     Patient Name: Vangie Kirby  Date:6/15/2022  : 1995  [x]  Patient  Verified  Payor: ABRIL / Plan: José Manuel Silveira 73 / Product Type:  /    In time:335  Out time: 425  Total Treatment Time (min): 50  Visit #: 6 of 12    Treatment Area: Left foot pain [M79.722]    SUBJECTIVE  Pain Level (0-10 scale): 5-610  Any medication changes, allergies to medications, adverse drug reactions, diagnosis change, or new procedure performed?: [x] No    [] Yes (see summary sheet for update)  Subjective functional status/changes:   [] No changes reported  Pt reports increased pain and soreness from added TB resistance last session. Will not be able to progress to shoe standing exercises.      OBJECTIVE    Modality rationale: decrease inflammation and decrease pain to improve the patients ability to perform work duties    Min Type Additional Details    [] Estim:  []Unatt       []IFC  []Premod                        []Other:  []w/ice   []w/heat  Position:  Location:    [] Estim: []Att    []TENS instruct  []NMES                    []Other:  []w/US   []w/ice   []w/heat  Position:  Location:    []  Traction: [] Cervical       []Lumbar                       [] Prone          []Supine                       []Intermittent   []Continuous Lbs:  [] before manual  [] after manual   8 [x]  Ultrasound: []Continuous   [x] Pulsed                           [x]1MHz   []3MHz W/cm2: 1.0  Location: l lateral ankle    []  Iontophoresis with dexamethasone         Location: [] Take home patch   [] In clinic   10 [x]  Ice     []  heat  []  Ice massage  []  Laser   []  Anodyne Position: semi-reclined  Location: L ankle    []  Laser with stim  []  Other:  Position:  Location:    []  Vasopneumatic Device  Pre-treatment girth:   Post-treatment girth:   Measured at landmark location:  Pressure:       [] lo [] med [] hi   Temperature: [] lo [] med [] hi   [] Skin assessment post-treatment:  []intact []redness- no adverse reaction    []redness - adverse reaction:   Vasopnuematic compression justification:  Per bilateral girth measures taken and listed above the edema is considered significant and having an impact on the patient's strength and balance    H min Therapeutic Exercise:  [x] See flow sheet :     Rationale: increase ROM and increase strength to improve the patients ability to perform household duties     x min Therapeutic Activity:  []  See flow sheet :   Rationale: increase ROM and increase strength  to improve the patients ability to perform work duties      32 min Neuromuscular Re-education:  []  See flow sheet :  Bike- held pain  Toe yoga  Towel scrunches  Marbles  4 way ankle- held secondary to pain     Rationale: increase ROM and increase strength  to improve the patients gait stability    x min Manual Therapy: The manual therapy interventions were performed at a separate and distinct time from the therapeutic activities interventions. Rationale: decrease pain and increase ROM to improve her gait mechanics          With   [] TE   [] TA   [] neuro   [] other: Patient Education: [x] Review HEP    [] Progressed/Changed HEP based on:   [] positioning   [] body mechanics   [] transfers   [] heat/ice application    [] other:      Other Objective/Functional Measures:   -Held bike and resisted TB and standing therex due to increased pain in the L ankle. Pain Level (0-10 scale) post treatment: 2-3 /10     ASSESSMENT/Changes in Function:   Held progression this session secondary to increased pain this session due to the added resistance with 4 way ankle last visit. Reduced pain this session post treatment. Continue to progress as tolerated. Patient will continue to benefit from skilled PT services to modify and progress therapeutic interventions, address functional mobility deficits, address ROM deficits and address strength deficits to attain remaining goals.      [x]  See Plan of Care  [] See progress note/recertification  []  See Discharge Summary         Progress towards goals / Updated goals:  Short term goals to be accomplished in 4 visits:   1. The pt will be IND and compliant with HEP and self management of symptoms. Pt reports compliance with HEP 6/6/22  2. The patient will report pain average of </= 3/10 as an indicator of improved QoL. Increased pain this session due overuse yesterday 6/6/22    Long term goals to be accomplished in 12 visits:   1. The patient will improve left ankle strength to 4+/5 globally to improve her walking tolerance. Added 4 way ankle back to program 6/13/22  2. The patient will improve left ankle INV AROM to 35 deg and EVR AROM to 15 deg for improved gait mechanics. 3. The patient will improve B/L knee strength globally to 5/5 for improved walking tolerance. 4. The patient will improve B/L hip flexion strength to 5/5 for improved tolerance to performing work duties. 5. The pt will improve FOTO score to 76/100 as a functional indicator of improved mobility.       PLAN  [x]  Upgrade activities as tolerated     [x]  Continue plan of care  []  Update interventions per flow sheet       []  Discharge due to:_  [x]  Other: check goals NV      Anton Mcgrath DPT 6/15/2022  519   PM    Future Appointments   Date Time Provider Karely Sloan   6/20/2022  3:30 PM Lennox Phillips DPT [facility]   6/22/2022  3:30 PM Lennox Phillips DPT [facility]   6/27/2022  3:30 PM Lennox Phillips DPT [facility]   6/29/2022  3:30 PM Lennox Phillips DPT [facility]

## 2022-06-20 ENCOUNTER — HOSPITAL ENCOUNTER (OUTPATIENT)
Dept: PHYSICAL THERAPY | Age: 27
Discharge: HOME | End: 2022-06-20
Payer: OTHER GOVERNMENT

## 2022-06-20 PROCEDURE — 97110 THERAPEUTIC EXERCISES: CPT | Performed by: PHYSICAL THERAPIST

## 2022-06-20 PROCEDURE — 97112 NEUROMUSCULAR REEDUCATION: CPT | Performed by: PHYSICAL THERAPIST

## 2022-06-20 PROCEDURE — 97035 APP MDLTY 1+ULTRASOUND EA 15: CPT | Performed by: PHYSICAL THERAPIST

## 2022-06-20 NOTE — PROGRESS NOTES
PT DAILY TREATMENT NOTE     Patient Name: Carlos Dennis  Date:2022  : 1995  [x]  Patient  Verified  Payor:  / Plan: Kamilla Lynn / Product Type:  /    In time:334  Out time: 432  Total Treatment Time (min): 58  Visit #: 7 of 12    Treatment Area: Left foot pain [M79.142]    SUBJECTIVE  Pain Level (0-10 scale): 4-5/10  Any medication changes, allergies to medications, adverse drug reactions, diagnosis change, or new procedure performed?: [x] No    [] Yes (see summary sheet for update)  Subjective functional status/changes:   [] No changes reported  Pt reports reduced pain in the anterior part of her foot is feeling better under the ankle is still hurting.      OBJECTIVE    Modality rationale: decrease inflammation and decrease pain to improve the patients ability to perform work duties    Min Type Additional Details    [] Estim:  []Unatt       []IFC  []Premod                        []Other:  []w/ice   []w/heat  Position:  Location:    [] Estim: []Att    []TENS instruct  []NMES                    []Other:  []w/US   []w/ice   []w/heat  Position:  Location:    []  Traction: [] Cervical       []Lumbar                       [] Prone          []Supine                       []Intermittent   []Continuous Lbs:  [] before manual  [] after manual   8 [x]  Ultrasound: []Continuous   [x] Pulsed                           [x]1MHz   []3MHz W/cm2: 1.0  Location: l lateral ankle    []  Iontophoresis with dexamethasone         Location: [] Take home patch   [] In clinic   10 [x]  Ice     []  heat  []  Ice massage  []  Laser   []  Anodyne Position: semi-reclined  Location: L ankle    []  Laser with stim  []  Other:  Position:  Location:    []  Vasopneumatic Device  Pre-treatment girth:   Post-treatment girth:   Measured at landmark location:  Pressure:       [] lo [] med [] hi   Temperature: [] lo [] med [] hi   [] Skin assessment post-treatment:  []intact []redness- no adverse reaction []redness - adverse reaction:   Vasopnuematic compression justification:  Per bilateral girth measures taken and listed above the edema is considered significant and having an impact on the patient's strength and balance    25 min Therapeutic Exercise:  [x] See flow sheet :  Bike  Standing incline stretch  Standing HR/TR  3 way hip   Rationale: increase ROM and increase strength to improve the patients ability to perform household duties     x min Therapeutic Activity:  []  See flow sheet :   Rationale: increase ROM and increase strength  to improve the patients ability to perform work duties      15 min Neuromuscular Re-education:  []  See flow sheet :  Towel scrunches  Marbles  Toe yoga     Rationale: increase ROM and increase strength  to improve the patients gait stability    x min Manual Therapy: The manual therapy interventions were performed at a separate and distinct time from the therapeutic activities interventions. Rationale: decrease pain and increase ROM to improve her gait mechanics          With   [] TE   [] TA   [] neuro   [] other: Patient Education: [x] Review HEP    [] Progressed/Changed HEP based on:   [] positioning   [] body mechanics   [] transfers   [] heat/ice application    [] other:      Other Objective/Functional Measures:   AROM of the L ankle: IN 45 deg with pain, EV: 10 deg   Increased pain with all added standing therex    Pain Level (0-10 scale) post treatment: 2-3 /10     ASSESSMENT/Changes in Function:   Improved AROM of this L ankle this session however, continues to be limited by pain for all standing and added resistance to the L ankle. Continue to progress as tolerated to maximize standing tolerance. Patient will continue to benefit from skilled PT services to modify and progress therapeutic interventions, address functional mobility deficits, address ROM deficits and address strength deficits to attain remaining goals.      [x]  See Plan of Care  []  See progress note/recertification  []  See Discharge Summary         Progress towards goals / Updated goals:  Short term goals to be accomplished in 4 visits:   1. The pt will be IND and compliant with HEP and self management of symptoms. Pt reports compliance with HEP 6/6/22  2. The patient will report pain average of </= 3/10 as an indicator of improved QoL. Increased pain this session due overuse yesterday 6/6/22    Long term goals to be accomplished in 12 visits:   1. The patient will improve left ankle strength to 4+/5 globally to improve her walking tolerance. Added 4 way ankle back to program 6/13/22  2. The patient will improve left ankle INV AROM to 35 deg and EVR AROM to 15 deg for improved gait mechanics. Progressing 6/20/22  3. The patient will improve B/L knee strength globally to 5/5 for improved walking tolerance. 4. The patient will improve B/L hip flexion strength to 5/5 for improved tolerance to performing work duties. 5. The pt will improve FOTO score to 76/100 as a functional indicator of improved mobility.       PLAN  [x]  Upgrade activities as tolerated     [x]  Continue plan of care  []  Update interventions per flow sheet       []  Discharge due to:_  []  Other:     Sha [place], MIKI 6/20/2022  425 PM    Future Appointments   Date Time Provider Karely Sloan   6/22/2022  3:30 PM Le Danielle, DPT [facility]   6/27/2022  3:30 PM Le Oxford, DPT [facility]   6/29/2022  3:30 PM Le Danielle, DPT [facility]   7/6/2022  3:30 PM Le Danielle, DPT [facility]   7/11/2022  3:30 PM Le Danielle, DPT [facility]   7/13/2022  3:30 PM [address] [place] [address] 2 [facility]   7/18/2022  3:30 PM Le Oxford, DPT [facility]   7/20/2022  3:30 PM Le Oxford, DPT [facility]   7/25/2022  3:30 PM Le Oxford, DPT [facility]   7/27/2022  3:30 PM Le Oxford, DPT [facility]

## 2022-06-22 ENCOUNTER — HOSPITAL ENCOUNTER (OUTPATIENT)
Dept: PHYSICAL THERAPY | Age: 27
Discharge: HOME | End: 2022-06-22
Payer: OTHER GOVERNMENT

## 2022-06-22 PROCEDURE — 97035 APP MDLTY 1+ULTRASOUND EA 15: CPT | Performed by: PHYSICAL THERAPIST

## 2022-06-22 PROCEDURE — 97530 THERAPEUTIC ACTIVITIES: CPT | Performed by: PHYSICAL THERAPIST

## 2022-06-22 NOTE — PROGRESS NOTES
PT DAILY TREATMENT NOTE     Patient Name: Iqra Novak  Date:2022  : 1995  [x]  Patient  Verified  Payor:  / Plan: José Manuel Silveira 73 / Product Type:  /    In time:335  Out time: 427   Total Treatment Time (min): 52  Visit #: 8 of 12    Treatment Area: Left foot pain [M79.342]    SUBJECTIVE  Pain Level (0-10 scale):-7 /10  Any medication changes, allergies to medications, adverse drug reactions, diagnosis change, or new procedure performed?: [x] No    [] Yes (see summary sheet for update)  Subjective functional status/changes:   [] No changes reported  Pt reports 50% improvement in symptoms. She reports improvement in anterior ankle pain and ability to weight bear however, every time she stands to progress strengthening in the L ankle she has increased pain and a couple day set back. Functional limitations include lifting her son and walking in/out of the boot, walking out of the boot short distances, floor transfers, strengthening exercises of the L ankle, and running/jumping.      OBJECTIVE    Modality rationale: decrease inflammation and decrease pain to improve the patients ability to perform work duties    Min Type Additional Details    [] Estim:  []Unatt       []IFC  []Premod                        []Other:  []w/ice   []w/heat  Position:  Location:    [] Estim: []Att    []TENS instruct  []NMES                    []Other:  []w/US   []w/ice   []w/heat  Position:  Location:    []  Traction: [] Cervical       []Lumbar                       [] Prone          []Supine                       []Intermittent   []Continuous Lbs:  [] before manual  [] after manual   8 [x]  Ultrasound: []Continuous   [x] Pulsed                           [x]1MHz   []3MHz W/cm2: 1.0  Location: l lateral ankle    []  Iontophoresis with dexamethasone         Location: [] Take home patch   [] In clinic   10 [x]  Ice     []  heat  []  Ice massage  []  Laser   []  Anodyne Position: semi-reclined  Location: L ankle    []  Laser with stim  []  Other:  Position:  Location:    []  Vasopneumatic Device  Pre-treatment girth:   Post-treatment girth:   Measured at landmark location:  Pressure:       [] lo [] med [] hi   Temperature: [] lo [] med [] hi   [] Skin assessment post-treatment:  []intact []redness- no adverse reaction    []redness - adverse reaction:   Vasopnuematic compression justification:  Per bilateral girth measures taken and listed above the edema is considered significant and having an impact on the patient's strength and balance    H min Therapeutic Exercise:  [x] See flow sheet :  Bike  Standing incline stretch  Standing HR/TR  3 way hip   Rationale: increase ROM and increase strength to improve the patients ability to perform household duties     32 min Therapeutic Activity:  []  See flow sheet : PT reassessment, FOTO   Rationale: increase ROM and increase strength  to improve the patients ability to perform work duties      H min Neuromuscular Re-education:  []  See flow sheet :  Towel scrunches  Marbles  Toe yoga     Rationale: increase ROM and increase strength  to improve the patients gait stability    x min Manual Therapy: The manual therapy interventions were performed at a separate and distinct time from the therapeutic activities interventions.   Rationale: decrease pain and increase ROM to improve her gait mechanics          With   [] TE   [] TA   [] neuro   [] other: Patient Education: [x] Review HEP    [] Progressed/Changed HEP based on:   [] positioning   [] body mechanics   [] transfers   [] heat/ice application    [] other:      Other Objective/Functional Measures:   AROM of the L ankle: IN 35 deg with pain, EV: 10 deg, DF: 0 deg PF: 40 deg pain with all ranges  Increased pain in the L ankle 6-7/10  L ankle strength: 3/5  FOTO reduced to 52/100 from 55/100 at evaluation  Gait: antaglic gait on the L with reduced step length on the R, reduced heel-toe pattern on the L secondary to pain. Pt presents without CAM boot on the L ankle. B hip flexion strength: 5/5        Pain Level (0-10 scale) post treatment: 4-5 /10     ASSESSMENT/Changes in Function:   Upon reassessment, pt presents with increased pain levels secondary to progression of added weightbearing strengthening exercises to the L ankle last session. Pt presents with improved AROM of the L ankle however, continues to have reduced strength and increased pain limiting tolerance to all weight-bearing activities especially outside of the boot. At this time pt is IND with all therex and is unable to tolerate progression of therex without significant increase in pain and swelling post treatment that lasts a few days. At this time it is recommended that patient be placed on hold and FU with MD for further evaluation due to the lack of progress in PT. Will await MD recommendation. Patient will continue to benefit from skilled PT services to modify and progress therapeutic interventions, address functional mobility deficits, address ROM deficits and address strength deficits to attain remaining goals. [x]  See Plan of Care  []  See progress note/recertification  []  See Discharge Summary         Progress towards goals / Updated goals:  Short term goals to be accomplished in 4 visits:   1. The pt will be IND and compliant with HEP and self management of symptoms. Pt reports compliance with HEP 6/6/22  2. The patient will report pain average of </= 3/10 as an indicator of improved QoL. Not met 6/22/22    Long term goals to be accomplished in 12 visits:   1. The patient will improve left ankle strength to 4+/5 globally to improve her walking tolerance. Unable to progress secondary to pain, 3/5 6/22/22  2. The patient will improve left ankle INV AROM to 35 deg and EVR AROM to 15 deg for improved gait mechanics. Progressing 6/20/22  3.  The patient will improve B/L knee strength globally to 5/5 for improved walking tolerance. NT 6/22/22  4. The patient will improve B/L hip flexion strength to 5/5 for improved tolerance to performing work duties. Met 5/5 6/22/22  5. The pt will improve FOTO score to 76/100 as a functional indicator of improved mobility.   Limited 6/22/22    PLAN  [x]  Upgrade activities as tolerated     [x]  Continue plan of care  []  Update interventions per flow sheet       []  Discharge due to:_  [x]  Other:  Pt on hold until next MD FU, will continued 2x/week for 4 weeks if MD recommendeds    May Monterroso, DPT 6/22/2022  530 PM    Future Appointments   Date Time Provider Karely Sloan   6/27/2022  3:30 PM Link Maker, DPT [facility]   6/29/2022  3:30 PM Link Maker, DPT [facility]   7/6/2022  3:30 PM Link Maker, DPT [facility]   7/11/2022  3:30 PM Link Maker, DPT [facility]   7/13/2022  3:30 PM [facility] [place] [facility]   7/18/2022  3:30 PM Link Maker, DPT [facility]   7/20/2022  3:30 PM Link Maker, DPT [facility]   7/25/2022  3:30 PM Link Maker, DPT [facility]   7/27/2022  3:30 PM Link Maker, DPT [facility]

## 2022-06-27 ENCOUNTER — APPOINTMENT (OUTPATIENT)
Dept: PHYSICAL THERAPY | Age: 27
End: 2022-06-27
Payer: OTHER GOVERNMENT

## 2022-06-29 ENCOUNTER — APPOINTMENT (OUTPATIENT)
Dept: PHYSICAL THERAPY | Age: 27
End: 2022-06-29
Payer: OTHER GOVERNMENT

## 2022-06-29 NOTE — PROGRESS NOTES
[address] [facility] AT [facility]  [address] [facility] [address] [address],[address], [place], [place]  Phone [phone]  Fax [phone]  PROGRESS NOTE  Patient Name: Sushil Sanon : 1995   Treatment/Medical Diagnosis: Left foot pain [M79.672]   Referral Source: Brock Gunn MD     Date of Initial Visit: 22 Attended Visits: 8 Missed Visits: 0     SUMMARY OF TREATMENT  Pt seen for 8 therapy visits for L foot pain. CURRENT STATUS  Pt reports 50% improvement in symptoms. She reports improvement in anterior ankle pain and ability to weight bear however, every time she stands to progress strengthening in the L ankle she has increased pain and a couple day set back. Functional limitations include lifting her son and walking in/out of the boot, walking out of the boot short distances, floor transfers, strengthening exercises of the L ankle, and running/jumping. Upon reassessment, pt presents with increased pain levels secondary to progression of added weightbearing strengthening exercises to the L ankle last session. Pt presents with improved AROM of the L ankle however, continues to have reduced strength and increased pain limiting tolerance to all weight-bearing activities especially outside of the boot. At this time pt is IND with all therex and is unable to tolerate progression of therex without significant increase in pain and swelling post treatment that lasts a few days. At this time it is recommended that patient be placed on hold and FU with MD for further evaluation due to the lack of progress in PT. Will await MD recommendation.      Other Objective/Functional Measures:   AROM of the L ankle: IN 35 deg with pain, EV: 10 deg, DF: 0 deg PF: 40 deg pain with all ranges  Increased pain in the L ankle 6-7/10  L ankle strength: 3/5  FOTO reduced to 52/100 from 55/100 at evaluation  Gait: antaglic gait on the L with reduced step length on the R, reduced heel-toe pattern on the L secondary to pain. Pt presents without CAM boot on the L ankle. B hip flexion strength: 5/5       Goal/Measure of Progress Goal Met? Short term goals to be accomplished in 4 visits:   1. The pt will be IND and compliant with HEP and self management of symptoms. Pt reports compliance with HEP 6/6/22  2. The patient will report pain average of </= 3/10 as an indicator of improved QoL. Not met 6/22/22     Long term goals to be accomplished in 12 visits:   1. The patient will improve left ankle strength to 4+/5 globally to improve her walking tolerance. Unable to progress secondary to pain, 3/5 6/22/22  2. The patient will improve left ankle INV AROM to 35 deg and EVR AROM to 15 deg for improved gait mechanics. Progressing 6/20/22  3. The patient will improve B/L knee strength globally to 5/5 for improved walking tolerance. NT 6/22/22  4. The patient will improve B/L hip flexion strength to 5/5 for improved tolerance to performing work duties. Met 5/5 6/22/22  5. The pt will improve FOTO score to 76/100 as a functional indicator of improved mobility. Limited 6/22/22     New Goals to be achieved in __4__  weeks:  Continue unmet goals stated above   RECOMMENDATIONS  Pt on hold until next MD FU, will continued 2x/week for 4 weeks if MD recommendeds  If you have any questions/comments please contact us directly at [phone]. Thank you for allowing us to assist in the care of your patient. Therapist Signature: Nola Gan DPT Date: 6/22/22     Time: 5:30 PM   NOTE TO PHYSICIAN:  PLEASE COMPLETE THE ORDERS BELOW AND FAX TO   [facility]: [phone].   If you are unable to process this request in 24 hours please contact our office: [phone].    ___ I have read the above report and request that my patient continue as recommended.   ___ I have read the above report and request that my patient continue therapy with the following changes/special instructions:_________________________________________________________   ___ I have read the above report and request that my patient be discharged from therapy.      Physician Signature:        Date:       Time:    Nancey Opitz, MD

## 2022-07-06 ENCOUNTER — APPOINTMENT (OUTPATIENT)
Dept: PHYSICAL THERAPY | Age: 27
End: 2022-07-06
Payer: OTHER GOVERNMENT

## 2022-07-11 ENCOUNTER — APPOINTMENT (OUTPATIENT)
Dept: PHYSICAL THERAPY | Age: 27
End: 2022-07-11
Payer: OTHER GOVERNMENT

## 2022-07-13 ENCOUNTER — APPOINTMENT (OUTPATIENT)
Dept: PHYSICAL THERAPY | Age: 27
End: 2022-07-13
Payer: OTHER GOVERNMENT

## 2022-07-18 ENCOUNTER — HOSPITAL ENCOUNTER (OUTPATIENT)
Dept: PHYSICAL THERAPY | Age: 27
Discharge: HOME | End: 2022-07-18
Payer: OTHER GOVERNMENT

## 2022-07-18 PROCEDURE — 97110 THERAPEUTIC EXERCISES: CPT | Performed by: PHYSICAL THERAPIST

## 2022-07-18 PROCEDURE — 97530 THERAPEUTIC ACTIVITIES: CPT | Performed by: PHYSICAL THERAPIST

## 2022-07-18 PROCEDURE — 97014 ELECTRIC STIMULATION THERAPY: CPT | Performed by: PHYSICAL THERAPIST

## 2022-07-18 NOTE — PROGRESS NOTES
PT DAILY TREATMENT NOTE     Patient Name: Pillo Phillips  Date:2022  : 1995  [x]  Patient  Verified  Payor:  / Plan: José Manuel Silveira 73 / Product Type:  /    In time:338  Out time: 431   Total Treatment Time (min): 53  Visit #: 1 of 8    Treatment Area: Left foot pain [M79.432]    SUBJECTIVE  Pain Level (0-10 scale):3/10  Any medication changes, allergies to medications, adverse drug reactions, diagnosis change, or new procedure performed?: [x] No    [] Yes (see summary sheet for update)  Subjective functional status/changes:   [] No changes reported  Pt reports that she saw her MD on 22 and she took her out of the boot and put her in a lace up ankle brace. She had an injection in the L ankle which she notes did not help and ordered 4 more weeks of PT. She is to return on 22 for a FU.  If she is not better they will order and MRI and refer her to an ankle specialist.     OBJECTIVE    Modality rationale: decrease inflammation and decrease pain to improve the patients ability to perform work duties    Min Type Additional Details   15 [x] Estim:  []Unatt       [x]IFC  []Premod                        []Other:  [x]w/ice   []w/heat  Position: L Lateral Malleoli   Location:semi-reclined    [] Estim: []Att    []TENS instruct  []NMES                    []Other:  []w/US   []w/ice   []w/heat  Position:  Location:    []  Traction: [] Cervical       []Lumbar                       [] Prone          []Supine                       []Intermittent   []Continuous Lbs:  [] before manual  [] after manual   DC [x]  Ultrasound: []Continuous   [x] Pulsed                           [x]1MHz   []3MHz W/cm2: 1.0  Location: l lateral ankle    []  Iontophoresis with dexamethasone         Location: [] Take home patch   [] In clinic    []  Ice     []  heat  []  Ice massage  []  Laser   []  Anodyne Position:   Location:     []  Laser with stim  []  Other:  Position:  Location:    []  Vasopneumatic Device  Pre-treatment girth:   Post-treatment girth:   Measured at landmark location:  Pressure:       [] lo [] med [] hi   Temperature: [] lo [] med [] hi   [] Skin assessment post-treatment:  []intact []redness- no adverse reaction    []redness - adverse reaction:   Vasopnuematic compression justification:  Per bilateral girth measures taken and listed above the edema is considered significant and having an impact on the patient's strength and balance    28 min Therapeutic Exercise:  [x] See flow sheet :  Bike  Standing incline stretch  Standing HR/TR  3 way hip   Rationale: increase ROM and increase strength to improve the patients ability to perform household duties     10 min Therapeutic Activity:  []  See flow sheet :   Mini-squats   BOSU step ups   Rationale: increase ROM and increase strength  to improve the patients ability to perform work duties      H min Neuromuscular Re-education:  []  See flow sheet :       Rationale: increase ROM and increase strength  to improve the patients gait stability    x min Manual Therapy: The manual therapy interventions were performed at a separate and distinct time from the therapeutic activities interventions. Rationale: decrease pain and increase ROM to improve her gait mechanics          With   [] TE   [] TA   [] neuro   [] other: Patient Education: [x] Review HEP    [] Progressed/Changed HEP based on:   [] positioning   [] body mechanics   [] transfers   [] heat/ice application    [] other:      Other Objective/Functional Measures:   AROM of the L ankle: IN 30 deg with pain, EV: 5deg, DF: 10 deg PF: 45 deg pain with all ranges  Improved tolerance to standing therex  Pain around the L lateral malleoli    Pain Level (0-10 scale) post treatment: 4-5/10     ASSESSMENT/Changes in Function:   Progressed L ankle strengthening to standing which pt tolerated without increased pain. BRIANNA'Mery US and added IFC to reduce swelling over the L lateral malleoli.  Improved AROM into DF/PF this session. Continue to progress as tolerated. Patient will continue to benefit from skilled PT services to modify and progress therapeutic interventions, address functional mobility deficits, address ROM deficits and address strength deficits to attain remaining goals. [x]  See Plan of Care  []  See progress note/recertification  []  See Discharge Summary         Progress towards goals / Updated goals:  1. The patient will improve left ankle strength to 4+/5 globally to improve her walking tolerance. Unable to progress secondary to pain, 3/5 6/22/22  2. The patient will improve left ankle INV AROM to 35 deg and EVR AROM to 15 deg for improved gait mechanics. Progressing 7/18/22  3. The patient will improve B/L knee strength globally to 5/5 for improved walking tolerance. NT 6/22/22  4.  The pt will improve FOTO score to 76/100 as a functional indicator of improved mobility.  Limited 6/22/22       PLAN  [x]  Upgrade activities as tolerated     [x]  Continue plan of care  []  Update interventions per flow sheet       []  Discharge due to:_  [x]  Other:  Check goals WILLIE Landaverde, MIKI 7/18/2022  454 PM    Future Appointments   Date Time Provider Karely Sloan   7/20/2022  3:30 PM Lien [place], DPT [facility]   7/25/2022  3:30 PM Lien [place], DPT [facility]   7/27/2022  3:30 PM Lien [place], DPT [facility]   8/8/2022  3:30 PM Lien [place], DPT [facility]   8/10/2022  3:30 PM Lien [place], DPT [facility]   8/15/2022  3:30 PM Lien [place], DPT [facility]

## 2022-07-20 ENCOUNTER — HOSPITAL ENCOUNTER (OUTPATIENT)
Dept: PHYSICAL THERAPY | Age: 27
Discharge: HOME | End: 2022-07-20
Payer: OTHER GOVERNMENT

## 2022-07-20 PROCEDURE — 97014 ELECTRIC STIMULATION THERAPY: CPT | Performed by: PHYSICAL THERAPIST

## 2022-07-20 PROCEDURE — 97530 THERAPEUTIC ACTIVITIES: CPT | Performed by: PHYSICAL THERAPIST

## 2022-07-20 PROCEDURE — 97110 THERAPEUTIC EXERCISES: CPT | Performed by: PHYSICAL THERAPIST

## 2022-07-20 NOTE — PROGRESS NOTES
PT DAILY TREATMENT NOTE     Patient Name: Shellie Waggoner  Date:2022  : 1995  [x]  Patient  Verified  Payor:  / Plan: José Manuel Silveira 73 / Product Type:  /    In time:342  Out time: 442  Total Treatment Time (min): 60  Visit #: 2 of 8    Treatment Area: Left foot pain [M79.301]    SUBJECTIVE  Pain Level (0-10 scale):3/10  Any medication changes, allergies to medications, adverse drug reactions, diagnosis change, or new procedure performed?: [x] No    [] Yes (see summary sheet for update)  Subjective functional status/changes:   [] No changes reported  Pt reports that she was sore after last session however, it was different than the last however, as it wasn't pain with progression.      OBJECTIVE    Modality rationale: decrease inflammation and decrease pain to improve the patients ability to perform work duties    Min Type Additional Details   15 [x] Estim:  []Unatt       [x]IFC  []Premod                        []Other:  [x]w/ice   []w/heat  Position: L Lateral Malleoli   Location:semi-reclined    [] Estim: []Att    []TENS instruct  []NMES                    []Other:  []w/US   []w/ice   []w/heat  Position:  Location:    []  Traction: [] Cervical       []Lumbar                       [] Prone          []Supine                       []Intermittent   []Continuous Lbs:  [] before manual  [] after manual   DC [x]  Ultrasound: []Continuous   [x] Pulsed                           [x]1MHz   []3MHz W/cm2: 1.0  Location: l lateral ankle    []  Iontophoresis with dexamethasone         Location: [] Take home patch   [] In clinic    []  Ice     []  heat  []  Ice massage  []  Laser   []  Anodyne Position:   Location:     []  Laser with stim  []  Other:  Position:  Location:    []  Vasopneumatic Device  Pre-treatment girth:   Post-treatment girth:   Measured at landmark location:  Pressure:       [] lo [] med [] hi   Temperature: [] lo [] med [] hi   [] Skin assessment post-treatment: []intact []redness- no adverse reaction    []redness - adverse reaction:   Vasopnuematic compression justification:  Per bilateral girth measures taken and listed above the edema is considered significant and having an impact on the patient's strength and balance    35 min Therapeutic Exercise:  [x] See flow sheet :  Bike  Standing incline stretch  Standing HR/TR  3 way hip  4 way ankle   Rationale: increase ROM and increase strength to improve the patients ability to perform household duties     10 min Therapeutic Activity:  []  See flow sheet :   Mini-squats   BOSU step ups   Rationale: increase ROM and increase strength  to improve the patients ability to perform work duties      H min Neuromuscular Re-education:  []  See flow sheet :       Rationale: increase ROM and increase strength  to improve the patients gait stability    x min Manual Therapy: The manual therapy interventions were performed at a separate and distinct time from the therapeutic activities interventions. Rationale: decrease pain and increase ROM to improve her gait mechanics          With   [] TE   [] TA   [] neuro   [] other: Patient Education: [x] Review HEP    [] Progressed/Changed HEP based on:   [] positioning   [] body mechanics   [] transfers   [] heat/ice application    [] other:      Other Objective/Functional Measures:   -Increased pain and fatigued with added 4-way ankle with OTB    Pain Level (0-10 scale) post treatment: 0/10     ASSESSMENT/Changes in Function:   Pt tolerated all standing therex without increased pain. Did not add further standing therex due to residual soreness post last treatment. Continue to progress as tolerated but within patient tolerance. Patient will continue to benefit from skilled PT services to modify and progress therapeutic interventions, address functional mobility deficits, address ROM deficits and address strength deficits to attain remaining goals.      [x]  See Plan of Care  []  See progress note/recertification  []  See Discharge Summary         Progress towards goals / Updated goals:  1. The patient will improve left ankle strength to 4+/5 globally to improve her walking tolerance. added 4-way ankle this session 7/20/22  2. The patient will improve left ankle INV AROM to 35 deg and EVR AROM to 15 deg for improved gait mechanics. Progressing 7/18/22  3. The patient will improve B/L knee strength globally to 5/5 for improved walking tolerance. NT 6/22/22  4. The pt will improve FOTO score to 76/100 as a functional indicator of improved mobility.   Limited 6/22/22       PLAN  [x]  Upgrade activities as tolerated     [x]  Continue plan of care  []  Update interventions per flow sheet       []  Discharge due to:_  []  Other:      Wayne Nair DPT 7/20/2022  521  PM    Future Appointments   Date Time Provider Karely Sloan   7/25/2022  3:30 PM Danita Gil, MIKI [facility]   7/27/2022  3:30 PM Danita Gil, MIKI [facility]   8/8/2022  3:30 PM Danita Gil DPT [facility]   8/10/2022  3:30 PM Danita Gil DPT [facility]   8/15/2022  3:30 PM Danita Gil, IMKI [facility]

## 2022-07-25 ENCOUNTER — APPOINTMENT (OUTPATIENT)
Dept: PHYSICAL THERAPY | Age: 27
End: 2022-07-25
Payer: OTHER GOVERNMENT

## 2022-07-27 ENCOUNTER — APPOINTMENT (OUTPATIENT)
Dept: PHYSICAL THERAPY | Age: 27
End: 2022-07-27
Payer: OTHER GOVERNMENT

## 2022-08-08 ENCOUNTER — HOSPITAL ENCOUNTER (OUTPATIENT)
Dept: PHYSICAL THERAPY | Age: 27
Discharge: HOME | End: 2022-08-08
Payer: OTHER GOVERNMENT

## 2022-08-08 PROCEDURE — 97014 ELECTRIC STIMULATION THERAPY: CPT | Performed by: PHYSICAL THERAPIST

## 2022-08-08 PROCEDURE — 97530 THERAPEUTIC ACTIVITIES: CPT | Performed by: PHYSICAL THERAPIST

## 2022-08-08 NOTE — PROGRESS NOTES
Spirometry done.   PT DAILY TREATMENT NOTE     Patient Name: Carmen Hardin  Date:2022  : 1995  [x]  Patient  Verified  Payor:  / Plan: José Manuel Silveira 73 / Product Type:  /    In time:332  Out time: 430  Total Treatment Time (min): 58  Visit #: 3 of 8    Treatment Area: Left foot pain [M79.962]    SUBJECTIVE  Pain Level (0-10 scale):3-4/10  Any medication changes, allergies to medications, adverse drug reactions, diagnosis change, or new procedure performed?: [x] No    [] Yes (see summary sheet for update)  Subjective functional status/changes:   [] No changes reported  Pt reports 70% improvement since SOC. She reports improvement in ability to stand without boot, walking tolerance, her pain level decreased, and ROM. Functional limitations include weight-bearing >30 min, inversion of the L ankle, and overall strength of the L ankle. She is unable to squat, run, jump.      OBJECTIVE    Modality rationale: decrease inflammation and decrease pain to improve the patients ability to perform work duties    Min Type Additional Details   15 [x] Estim:  []Unatt       [x]IFC  []Premod                        []Other:  [x]w/ice   []w/heat  Position: L Lateral Malleoli   Location:semi-reclined    [] Estim: []Att    []TENS instruct  []NMES                    []Other:  []w/US   []w/ice   []w/heat  Position:  Location:    []  Traction: [] Cervical       []Lumbar                       [] Prone          []Supine                       []Intermittent   []Continuous Lbs:  [] before manual  [] after manual   DC [x]  Ultrasound: []Continuous   [x] Pulsed                           [x]1MHz   []3MHz W/cm2: 1.0  Location: l lateral ankle    []  Iontophoresis with dexamethasone         Location: [] Take home patch   [] In clinic    []  Ice     []  heat  []  Ice massage  []  Laser   []  Anodyne Position:   Location:     []  Laser with stim  []  Other:  Position:  Location:    []  Vasopneumatic Device  Pre-treatment girth: Post-treatment girth:   Measured at landmark location:  Pressure:       [] lo [] med [] hi   Temperature: [] lo [] med [] hi   [] Skin assessment post-treatment:  []intact []redness- no adverse reaction    []redness - adverse reaction:   Vasopnuematic compression justification:  Per bilateral girth measures taken and listed above the edema is considered significant and having an impact on the patient's strength and balance    H min Therapeutic Exercise:  [x] See flow sheet :  Bike  Standing incline stretch  Standing HR/TR  3 way hip  4 way ankle   Rationale: increase ROM and increase strength to improve the patients ability to perform household duties     43 min Therapeutic Activity:  []  See flow sheet :   PT reassessment/FOTO   Rationale: increase ROM and increase strength  to improve the patients ability to perform work duties      H min Neuromuscular Re-education:  []  See flow sheet :       Rationale: increase ROM and increase strength  to improve the patients gait stability    x min Manual Therapy: The manual therapy interventions were performed at a separate and distinct time from the therapeutic activities interventions. Rationale: decrease pain and increase ROM to improve her gait mechanics          With   [] TE   [] TA   [] neuro   [] other: Patient Education: [x] Review HEP    [] Progressed/Changed HEP based on:   [] positioning   [] body mechanics   [] transfers   [] heat/ice application    [] other:      Other Objective/Functional Measures:    FOTO: 68/100 from 52/100 at last assessment  L ankle AROM: DF: 10 deg, PF: 50 deg, IN: 40 deg, EV: 15 deg  L ankle strength: 5/5  L SL heel raise: 20 reps with 6/10 pain  SLS: able to perform 30 sec B with pain on the L    Pain Level (0-10 scale) post treatment: 0/10     ASSESSMENT/Changes in Function:   Upon reassessment, pt presents with improvements in L ankle ROM and strength however, continues to have significant pain levels with higher level/impact activities. Pt would benefit from continued therapy for higher level strengthening/plyomentrics to be able to ambulate on uneven ground and chloé after her son without pain. Patient will continue to benefit from skilled PT services to modify and progress therapeutic interventions, address functional mobility deficits, address ROM deficits and address strength deficits to attain remaining goals. [x]  See Plan of Care  []  See progress note/recertification  []  See Discharge Summary         Progress towards goals / Updated goals:  1. The patient will improve left ankle strength to 4+/5 globally to improve her walking tolerance. met with pain 8/8/22  2. The patient will improve left ankle INV AROM to 35 deg and EVR AROM to 15 deg for improved gait mechanics. Met 8/8/22  3. The patient will improve B/L knee strength globally to 5/5 for improved walking tolerance. NT 6/22/22  4. The pt will improve FOTO score to 76/100 as a functional indicator of improved mobility.   progressing 8/8/22       PLAN  [x]  Upgrade activities as tolerated     [x]  Continue plan of care  []  Update interventions per flow sheet       []  Discharge due to:_  [x]  Other: Continue PT 1-2x/week for 4 weeks      Dora Rodríguez DPT 8/8/2022  521  PM    Future Appointments   Date Time Provider Karely Sloan   8/10/2022  3:30 PM Claude Abbott, DPT [facility]   8/15/2022  3:30 PM Claude Abbott, DPT [facility]

## 2022-08-10 ENCOUNTER — APPOINTMENT (OUTPATIENT)
Dept: PHYSICAL THERAPY | Age: 27
End: 2022-08-10
Payer: OTHER GOVERNMENT

## 2022-08-15 ENCOUNTER — APPOINTMENT (OUTPATIENT)
Dept: PHYSICAL THERAPY | Age: 27
End: 2022-08-15
Payer: OTHER GOVERNMENT

## 2022-08-15 ENCOUNTER — HOSPITAL ENCOUNTER (OUTPATIENT)
Dept: PHYSICAL THERAPY | Age: 27
Discharge: HOME | End: 2022-08-15
Payer: OTHER GOVERNMENT

## 2022-08-15 PROCEDURE — 97530 THERAPEUTIC ACTIVITIES: CPT | Performed by: PHYSICAL THERAPIST

## 2022-08-15 PROCEDURE — 97110 THERAPEUTIC EXERCISES: CPT | Performed by: PHYSICAL THERAPIST

## 2022-08-15 PROCEDURE — 97014 ELECTRIC STIMULATION THERAPY: CPT | Performed by: PHYSICAL THERAPIST

## 2022-08-15 NOTE — PROGRESS NOTES
PT DAILY TREATMENT NOTE     Patient Name: Obdulio Duval  Date:8/15/2022  : 1995  [x]  Patient  Verified  Payor:  / Plan: Heather Locust / Product Type:  /    In time:335  Out time: 432  Total Treatment Time (min): 62  Visit #: 1 of 8    Treatment Area: Left foot pain [M68.642]    SUBJECTIVE  Pain Level (0-10 scale):3/10  Any medication changes, allergies to medications, adverse drug reactions, diagnosis change, or new procedure performed?: [x] No    [] Yes (see summary sheet for update)  Subjective functional status/changes:   [] No changes reported  Pt reports that she is sore today. Her pain is localized over the calcaneous.      OBJECTIVE    Modality rationale: decrease inflammation and decrease pain to improve the patients ability to perform work duties    Min Type Additional Details   13 [x] Estim:  []Unatt       [x]IFC  []Premod                        []Other:  [x]w/ice   []w/heat  Position: L Lateral Malleoli   Location:semi-reclined    [] Estim: []Att    []TENS instruct  []NMES                    []Other:  []w/US   []w/ice   []w/heat  Position:  Location:    []  Traction: [] Cervical       []Lumbar                       [] Prone          []Supine                       []Intermittent   []Continuous Lbs:  [] before manual  [] after manual   DC [x]  Ultrasound: []Continuous   [x] Pulsed                           [x]1MHz   []3MHz W/cm2: 1.0  Location: l lateral ankle    []  Iontophoresis with dexamethasone         Location: [] Take home patch   [] In clinic    []  Ice     []  heat  []  Ice massage  []  Laser   []  Anodyne Position:   Location:     []  Laser with stim  []  Other:  Position:  Location:    []  Vasopneumatic Device  Pre-treatment girth:   Post-treatment girth:   Measured at landmark location:  Pressure:       [] lo [] med [] hi   Temperature: [] lo [] med [] hi   [] Skin assessment post-treatment:  []intact []redness- no adverse reaction    []redness - adverse reaction:   Vasopnuematic compression justification:  Per bilateral girth measures taken and listed above the edema is considered significant and having an impact on the patient's strength and balance    10 min Therapeutic Exercise:  [x] See flow sheet :  TM on 2% incline  Standing incline stretch  Standing HS stretch   Rationale: increase ROM and increase strength to improve the patients ability to perform household duties     34 min Therapeutic Activity:  []  See flow sheet :   Mini-squats   BOSU step ups  Eccentric HR at step  Eccentric step downs lateral       Rationale: increase ROM and increase strength  to improve the patients ability to perform work duties      H min Neuromuscular Re-education:  []  See flow sheet :       Rationale: increase ROM and increase strength  to improve the patients gait stability    x min Manual Therapy: The manual therapy interventions were performed at a separate and distinct time from the therapeutic activities interventions. Rationale: decrease pain and increase ROM to improve her gait mechanics          With   [] TE   [] TA   [] neuro   [] other: Patient Education: [x] Review HEP    [] Progressed/Changed HEP based on:   [] positioning   [] body mechanics   [] transfers   [] heat/ice application    [] other:      Other Objective/Functional Measures:   -Added TM incline 2% at 2 mph  -Increased pain in the calcaneous with eccentric HR  -TTP over the R lateral calcaneal tendon    Pain Level (0-10 scale) post treatment: 0/10     ASSESSMENT/Changes in Function:   Pt tolerated progressed PREs without increased pain. Continued tenderness over the lateral calcaneal ligament on the L. Continue functional strengthening as tolerated. Patient will continue to benefit from skilled PT services to modify and progress therapeutic interventions, address functional mobility deficits, address ROM deficits and address strength deficits to attain remaining goals.      [x]  See Plan of Care  []  See progress note/recertification  []  See Discharge Summary         Progress towards goals / Updated goals:  1st FU since PN, Reasssess NV       PLAN  [x]  Upgrade activities as tolerated     [x]  Continue plan of care  []  Update interventions per flow sheet       []  Discharge due to:_  [x]  Other: check goals NV    Sravan Ford, DPT 8/15/2022  438  PM    Future Appointments   Date Time Provider Karely Sloan   8/23/2022  3:30 PM Augustine Jones [facility]   8/31/2022  4:15 PM Carolina President, DPT [facility]

## 2022-08-17 ENCOUNTER — APPOINTMENT (OUTPATIENT)
Dept: PHYSICAL THERAPY | Age: 27
End: 2022-08-17
Payer: OTHER GOVERNMENT

## 2022-08-18 NOTE — PROGRESS NOTES
[address] [facility] AT [facility]  [address] [facility] [address] [place] [address],[address], [place] [address], [place]  Phone [phone]  Fax [phone]  PROGRESS NOTE  Patient Name: Carlos Ott : 1995   Treatment/Medical Diagnosis: Left foot pain [M79.672]   Referral Source: Nehemiah Lew MD     Date of Initial Visit: 22 Attended Visits: 11 Missed Visits: 0     SUMMARY OF TREATMENT  Pt seen for 11 therapy visits for L foot pain. CURRENT STATUS  Pt reports 70% improvement since [facility]. She reports improvement in ability to stand without boot, walking tolerance, her pain level decreased, and ROM. Functional limitations include weight-bearing >30 min, inversion of the L ankle, and overall strength of the L ankle. She is unable to squat, run, jump. Upon reassessment, pt presents with improvements in L ankle ROM and strength however, continues to have significant pain levels with higher level/impact activities. Pt would benefit from continued therapy for higher level strengthening/plyomentrics to be able to ambulate on uneven ground and chloé after her son without pain. Other Objective/Functional Measures: FOTO: 68/100 from 52/100 at last assessment  L ankle AROM: DF: 10 deg, PF: 50 deg, IN: 40 deg, EV: 15 deg  L ankle strength: 5/5  L SL heel raise: 20 reps with 6/10 pain  SLS: able to perform 30 sec B with pain on the L    Other Objective/Functional Measures: taken on 22  AROM of the L ankle: IN 35 deg with pain, EV: 10 deg, DF: 0 deg PF: 40 deg pain with all ranges  Increased pain in the L ankle 6-7/10  L ankle strength: 3/5  FOTO reduced to 52/100 from 55/100 at evaluation  Gait: antaglic gait on the L with reduced step length on the R, reduced heel-toe pattern on the L secondary to pain. Pt presents without CAM boot on the L ankle. B hip flexion strength: 5/5       Goal/Measure of Progress Goal Met? 1.  The patient will improve left ankle strength to 4+/5 globally to improve her walking tolerance. met with pain 8/8/22  2. The patient will improve left ankle INV AROM to 35 deg and EVR AROM to 15 deg for improved gait mechanics. Met 8/8/22  3. The patient will improve B/L knee strength globally to 5/5 for improved walking tolerance. NT 6/22/22  4. The pt will improve FOTO score to 76/100 as a functional indicator of improved mobility. progressing 8/8/22        New Goals to be achieved in __4__  weeks:  Pt will be able to run 50ft without difficulty to be able to chloé her son. Pt will be able to stand for 4 hours with no more than 2/10 pain to increase tolerance for work activities. Pt will improve FOTO score to at least 76/100 as a functional indicator of improved mobility. RECOMMENDATIONS  Continue PT 1-2x/week for 4 weeks to progress towards long-term goals. If you have any questions/comments please contact us directly at [phone]. Thank you for allowing us to assist in the care of your patient. Therapist Signature: Hermes Gallagher DPT Date: 8/8/2022     Time: 5:30 PM   NOTE TO PHYSICIAN:  PLEASE COMPLETE THE ORDERS BELOW AND FAX TO   [facility]: [phone]. If you are unable to process this request in 24 hours please contact our office: [phone].    ___ I have read the above report and request that my patient continue as recommended.   ___ I have read the above report and request that my patient continue therapy with the following changes/special instructions:_________________________________________________________   ___ I have read the above report and request that my patient be discharged from therapy.      Physician Signature:        Date:       Time:    Lucrecia Quiroz MD

## 2022-08-23 ENCOUNTER — APPOINTMENT (OUTPATIENT)
Dept: PHYSICAL THERAPY | Age: 27
End: 2022-08-23
Payer: OTHER GOVERNMENT

## 2022-08-31 ENCOUNTER — APPOINTMENT (OUTPATIENT)
Dept: PHYSICAL THERAPY | Age: 27
End: 2022-08-31
Payer: OTHER GOVERNMENT

## 2022-09-07 ENCOUNTER — APPOINTMENT (OUTPATIENT)
Dept: PHYSICAL THERAPY | Age: 27
End: 2022-09-07
Payer: OTHER GOVERNMENT

## 2022-09-12 ENCOUNTER — HOSPITAL ENCOUNTER (OUTPATIENT)
Dept: PHYSICAL THERAPY | Age: 27
Discharge: HOME | End: 2022-09-12
Payer: OTHER GOVERNMENT

## 2022-09-12 PROCEDURE — 97530 THERAPEUTIC ACTIVITIES: CPT | Performed by: PHYSICAL THERAPIST

## 2022-09-12 NOTE — PROGRESS NOTES
[address] [facility] AT [facility]  [address] [facility] [address] [place] [address],[address], [place], [place]  Phone [phone]  Fax  SUMMARY  Patient Name: Jimi Gil : 1995   Treatment/Medical Diagnosis: Left foot pain [M79.672]   Referral Source: Kathrine Espana MD     Date of Initial Visit: 22 Attended Visits: 13 Missed Visits: 3     SUMMARY OF TREATMENT  Pt seen 13 PT visits for the L foot pain. CURRENT STATUS  Pt reports 85-90% improvement since [facility]. She reports improvements in ability to walk, work, less pain, and getting up and get a kid if need be. Functional limitations include increased fear to run. Pt is able to stand for 4 hours without pain and can run if need be. At this time due to other personal reasons she is ready to be DC'd. Upon reassessment, pt has a kidney stone and is not feeling the best. Due to other personal obligations pt would like to DC with updated HEP as she is able to self-manage symptoms and is no longer able to attend PT. She has met/progressing towards all long-term goals. Please DC from PT at this time. Other Objective/Functional Measures: FOTO: increased 75/100 from 68/100 at last assessment    Other Objective/Functional Measures: Measurements taken on 22  FOTO: 68/100 from 52/100 at last assessment  L ankle AROM: DF: 10 deg, PF: 50 deg, IN: 40 deg, EV: 15 deg  L ankle strength: 5/5  L SL heel raise: 20 reps with 6/10 pain  SLS: able to perform 30 sec B with pain on the L      Goal/Measure of Progress Goal Met? Progress towards goals / Updated goals:  1. The patient will improve left ankle strength to 4+/5 globally to improve her walking tolerance. met with pain 22  2. The patient will improve left ankle INV AROM to 35 deg and EVR AROM to 15 deg for improved gait mechanics. Met 22  3. The patient will improve B/L knee strength globally to 5/5 for improved walking tolerance. NT 22  4.  The pt will improve FOTO score to 76/100 as a functional indicator of improved mobility. progressing 8/8/22     RECOMMENDATIONS  Discontinue therapy. Progressing towards or have reached established goals. If you have any questions/comments please contact us directly at [phone]. Thank you for allowing us to assist in the care of your patient.     Therapist Signature: Wayne Nair DPT Date: 9/12/22     Time: 4:32 PM

## 2022-09-12 NOTE — PROGRESS NOTES
PT DAILY TREATMENT NOTE     Patient Name: Carmen Hardin  Date:2022  : 1995  [x]  Patient  Verified  Payor:  / Plan: José Manuel Silveira 74 / Product Type:  /    In time:332  Out time: 411  Total Treatment Time (min): 39  Visit #: 2 of 8    Treatment Area: Left foot pain [M79.982]    SUBJECTIVE  Pain Level (0-10 scale):1-2/10  Any medication changes, allergies to medications, adverse drug reactions, diagnosis change, or new procedure performed?: [x] No    [] Yes (see summary sheet for update)  Subjective functional status/changes:   [] No changes reported  Pt reports 85-90% improvement since SOC. She reports improvements in ability to walk, work, less pain, and getting up and get a kid if need be. Functional limitations include increased fear to run. Pt is able to stand for 4 hours without pain and can run if need be. At this time due to other personal reasons she is ready to be DC'd.      OBJECTIVE    Modality rationale: decrease inflammation and decrease pain to improve the patients ability to perform work duties    Min Type Additional Details   13 [x] Estim:  []Unatt       [x]IFC  []Premod                        []Other:  [x]w/ice   []w/heat  Position: L Lateral Malleoli   Location:semi-reclined    [] Estim: []Att    []TENS instruct  []NMES                    []Other:  []w/US   []w/ice   []w/heat  Position:  Location:    []  Traction: [] Cervical       []Lumbar                       [] Prone          []Supine                       []Intermittent   []Continuous Lbs:  [] before manual  [] after manual   DC [x]  Ultrasound: []Continuous   [x] Pulsed                           [x]1MHz   []3MHz W/cm2: 1.0  Location: l lateral ankle    []  Iontophoresis with dexamethasone         Location: [] Take home patch   [] In clinic    []  Ice     []  heat  []  Ice massage  []  Laser   []  Anodyne Position:   Location:     []  Laser with stim  []  Other:  Position:  Location:    [] Vasopneumatic Device  Pre-treatment girth:   Post-treatment girth:   Measured at landmark location:  Pressure:       [] lo [] med [] hi   Temperature: [] lo [] med [] hi   [] Skin assessment post-treatment:  []intact []redness- no adverse reaction    []redness - adverse reaction:   39 min Therapeutic Activity:  []  See flow sheet :   PT reassessment/FOTO  Updated HEP/reviewed     Rationale: increase ROM and increase strength  to improve the patients ability to perform work duties           With   [] TE   [] TA   [] neuro   [] other: Patient Education: [x] Review HEP    [] Progressed/Changed HEP based on:   [] positioning   [] body mechanics   [] transfers   [] heat/ice application    [] other:      Other Objective/Functional Measures: FOTO: increased 75/100 from 68/100 at last assessment    Pain Level (0-10 scale) post treatment:1-2/10     ASSESSMENT/Changes in Function:   Upon reassessment, pt has a kidney stone and is not feeling the best. Due to other personal obligations pt would like to DC with updated HEP as she is able to self-manage symptoms and is no longer able to attend PT. She has met/progressing towards all long-term goals. Please DC from PT at this time. .   []  See Plan of Care  []  See progress note/recertification  [x]  See Discharge Summary         Progress towards goals / Updated goals:  Pt will be able to run 50ft without difficulty to be able to chloé her son. Goal met, pt reports ability  if needed 9/12/22  Pt will be able to stand for 4 hours with no more than 2/10 pain to increase tolerance for work activities. Goal met 9/12/22  Pt will improve FOTO score to at least 76/100 as a functional indicator of improved mobility.  Progressing 75/100 9/12/22       PLAN  [x]  Discharge due to: pt request, progressing/met all long-term goals, pt given updated HEP      Fang Duarte DPT 9/12/2022  432  PM    Future Appointments   Date Time Provider Karely Sloan   9/14/2022  3:30 PM Dandy Noriega [address] [facility], DPT [facility]

## 2022-09-14 ENCOUNTER — APPOINTMENT (OUTPATIENT)
Dept: PHYSICAL THERAPY | Age: 27
End: 2022-09-14
Payer: OTHER GOVERNMENT

## 2024-03-14 ENCOUNTER — HOSPITAL ENCOUNTER (OUTPATIENT)
Facility: HOSPITAL | Age: 29
Setting detail: OBSERVATION
LOS: 1 days | Discharge: HOME | End: 2024-03-15
Attending: PLASTIC SURGERY | Admitting: PLASTIC SURGERY
Payer: COMMERCIAL

## 2024-03-14 PROBLEM — D72.829 LEUCOCYTOSIS: Status: ACTIVE | Noted: 2024-03-14

## 2024-03-14 PROCEDURE — 96374 THER/PROPH/DIAG INJ IV PUSH: CPT

## 2024-03-14 PROCEDURE — 6360000002 HC RX W HCPCS: Performed by: PLASTIC SURGERY

## 2024-03-14 PROCEDURE — G0378 HOSPITAL OBSERVATION PER HR: HCPCS

## 2024-03-14 PROCEDURE — 2580000003 HC RX 258: Performed by: PLASTIC SURGERY

## 2024-03-14 PROCEDURE — 6370000000 HC RX 637 (ALT 250 FOR IP): Performed by: PLASTIC SURGERY

## 2024-03-14 RX ORDER — ONDANSETRON 2 MG/ML
4 INJECTION INTRAMUSCULAR; INTRAVENOUS EVERY 6 HOURS PRN
Status: DISCONTINUED | OUTPATIENT
Start: 2024-03-14 | End: 2024-03-15 | Stop reason: HOSPADM

## 2024-03-14 RX ORDER — CYCLOBENZAPRINE HCL 10 MG
10 TABLET ORAL 2 TIMES DAILY
Status: DISCONTINUED | OUTPATIENT
Start: 2024-03-14 | End: 2024-03-15 | Stop reason: HOSPADM

## 2024-03-14 RX ORDER — DEXTROSE AND SODIUM CHLORIDE 5; .45 G/100ML; G/100ML
INJECTION, SOLUTION INTRAVENOUS CONTINUOUS
Status: DISCONTINUED | OUTPATIENT
Start: 2024-03-14 | End: 2024-03-14

## 2024-03-14 RX ORDER — MAGNESIUM SULFATE IN WATER 40 MG/ML
2000 INJECTION, SOLUTION INTRAVENOUS PRN
Status: DISCONTINUED | OUTPATIENT
Start: 2024-03-14 | End: 2024-03-15 | Stop reason: HOSPADM

## 2024-03-14 RX ORDER — BISACODYL 5 MG/1
5 TABLET, DELAYED RELEASE ORAL DAILY PRN
Status: DISCONTINUED | OUTPATIENT
Start: 2024-03-14 | End: 2024-03-15 | Stop reason: HOSPADM

## 2024-03-14 RX ORDER — OXYCODONE HYDROCHLORIDE AND ACETAMINOPHEN 5; 325 MG/1; MG/1
2 TABLET ORAL EVERY 4 HOURS PRN
Status: DISCONTINUED | OUTPATIENT
Start: 2024-03-14 | End: 2024-03-15 | Stop reason: HOSPADM

## 2024-03-14 RX ORDER — CLINDAMYCIN HYDROCHLORIDE 150 MG/1
300 CAPSULE ORAL EVERY 6 HOURS
Status: DISCONTINUED | OUTPATIENT
Start: 2024-03-14 | End: 2024-03-15 | Stop reason: HOSPADM

## 2024-03-14 RX ORDER — SODIUM CHLORIDE 0.9 % (FLUSH) 0.9 %
5-40 SYRINGE (ML) INJECTION PRN
Status: DISCONTINUED | OUTPATIENT
Start: 2024-03-14 | End: 2024-03-15 | Stop reason: HOSPADM

## 2024-03-14 RX ORDER — OXYCODONE HYDROCHLORIDE AND ACETAMINOPHEN 5; 325 MG/1; MG/1
1 TABLET ORAL EVERY 4 HOURS PRN
Status: DISCONTINUED | OUTPATIENT
Start: 2024-03-14 | End: 2024-03-15 | Stop reason: HOSPADM

## 2024-03-14 RX ORDER — ONDANSETRON 4 MG/1
4 TABLET, ORALLY DISINTEGRATING ORAL EVERY 8 HOURS PRN
Status: DISCONTINUED | OUTPATIENT
Start: 2024-03-14 | End: 2024-03-15 | Stop reason: HOSPADM

## 2024-03-14 RX ORDER — SODIUM CHLORIDE 0.9 % (FLUSH) 0.9 %
5-40 SYRINGE (ML) INJECTION EVERY 12 HOURS SCHEDULED
Status: DISCONTINUED | OUTPATIENT
Start: 2024-03-14 | End: 2024-03-15 | Stop reason: HOSPADM

## 2024-03-14 RX ORDER — ACETAMINOPHEN 650 MG/1
650 SUPPOSITORY RECTAL EVERY 6 HOURS PRN
Status: DISCONTINUED | OUTPATIENT
Start: 2024-03-14 | End: 2024-03-15 | Stop reason: HOSPADM

## 2024-03-14 RX ORDER — SODIUM CHLORIDE 9 MG/ML
INJECTION, SOLUTION INTRAVENOUS PRN
Status: DISCONTINUED | OUTPATIENT
Start: 2024-03-14 | End: 2024-03-15 | Stop reason: HOSPADM

## 2024-03-14 RX ORDER — HYDROMORPHONE HYDROCHLORIDE 1 MG/ML
0.5 INJECTION, SOLUTION INTRAMUSCULAR; INTRAVENOUS; SUBCUTANEOUS EVERY 4 HOURS PRN
Status: DISCONTINUED | OUTPATIENT
Start: 2024-03-14 | End: 2024-03-15 | Stop reason: HOSPADM

## 2024-03-14 RX ORDER — ENOXAPARIN SODIUM 100 MG/ML
40 INJECTION SUBCUTANEOUS DAILY
Status: DISCONTINUED | OUTPATIENT
Start: 2024-03-15 | End: 2024-03-15 | Stop reason: HOSPADM

## 2024-03-14 RX ORDER — POTASSIUM CHLORIDE 20 MEQ/1
40 TABLET, EXTENDED RELEASE ORAL PRN
Status: DISCONTINUED | OUTPATIENT
Start: 2024-03-14 | End: 2024-03-15 | Stop reason: HOSPADM

## 2024-03-14 RX ORDER — ACETAMINOPHEN 325 MG/1
650 TABLET ORAL EVERY 6 HOURS PRN
Status: DISCONTINUED | OUTPATIENT
Start: 2024-03-14 | End: 2024-03-15 | Stop reason: HOSPADM

## 2024-03-14 RX ORDER — POTASSIUM CHLORIDE 7.45 MG/ML
10 INJECTION INTRAVENOUS PRN
Status: DISCONTINUED | OUTPATIENT
Start: 2024-03-14 | End: 2024-03-15 | Stop reason: HOSPADM

## 2024-03-14 RX ORDER — DEXTROSE AND SODIUM CHLORIDE 5; .45 G/100ML; G/100ML
INJECTION, SOLUTION INTRAVENOUS CONTINUOUS
Status: DISCONTINUED | OUTPATIENT
Start: 2024-03-14 | End: 2024-03-15

## 2024-03-14 RX ORDER — HYDROMORPHONE HYDROCHLORIDE 1 MG/ML
0.5 INJECTION, SOLUTION INTRAMUSCULAR; INTRAVENOUS; SUBCUTANEOUS ONCE
Status: COMPLETED | OUTPATIENT
Start: 2024-03-14 | End: 2024-03-14

## 2024-03-14 RX ORDER — MEPERIDINE HYDROCHLORIDE 50 MG/ML
70 INJECTION INTRAMUSCULAR; INTRAVENOUS; SUBCUTANEOUS EVERY 4 HOURS PRN
Status: DISCONTINUED | OUTPATIENT
Start: 2024-03-14 | End: 2024-03-14

## 2024-03-14 RX ADMIN — HYDROMORPHONE HYDROCHLORIDE 0.5 MG: 1 INJECTION, SOLUTION INTRAMUSCULAR; INTRAVENOUS; SUBCUTANEOUS at 20:30

## 2024-03-14 RX ADMIN — CLINDAMYCIN HYDROCHLORIDE 300 MG: 150 CAPSULE ORAL at 21:49

## 2024-03-14 RX ADMIN — DEXTROSE AND SODIUM CHLORIDE 50 ML/HR: 5; 450 INJECTION, SOLUTION INTRAVENOUS at 20:31

## 2024-03-14 ASSESSMENT — PAIN SCALES - GENERAL
PAINLEVEL_OUTOF10: 9
PAINLEVEL_OUTOF10: 5

## 2024-03-14 ASSESSMENT — PAIN DESCRIPTION - DESCRIPTORS: DESCRIPTORS: ACHING;CRAMPING

## 2024-03-14 ASSESSMENT — PAIN DESCRIPTION - LOCATION: LOCATION: ABDOMEN

## 2024-03-14 ASSESSMENT — PAIN DESCRIPTION - ORIENTATION: ORIENTATION: MID;RIGHT;LEFT

## 2024-03-14 NOTE — PROGRESS NOTES
1825 Received report from [employer] representative. It is the first report being provided on patient. Assessed patient has bruising at LUIS drainage site. Serosanginous drainage in clamped LUIS drain. Aox4. Clear lungs bilateral. Midline incision and lower abdominal incision from L/R. Patient stated is having abdominal pain heard a popping noise, noticed increase drainage and bruising at LUIS site. Patient stated at [facility] was given Vancomycin and Zosyn twice, had sepsis workup for WBC of 17 and was told is a UTI.  Received strips from [employer] and folder from [facility].   1840 Came to nursing station and found a written report calling registration to get patient into system.     1905 Spoke with MD Alxe Umanzor and got orders for D5 1/2 NS @ 100ml/hr and 70mg Demerol q4 PRN. Stated will contact MD Wise to get further information.     1928 Pharmacy calling to verify allergies    1929 MD Wise called. 0.5 mg of dilaudid x1 for now. D5 1/2 NS @ 50ml hr   Cell# [phone]. Stated will put in new orders in about 10min.   Eat /drink reg - Diet

## 2024-03-14 NOTE — PROGRESS NOTES
TRANSFER - IN REPORT:    Verbal report received from carissa gilliam on Armida Turk  being received from [facility]  for urgent transfer      Report consisted of patient's Situation, Background, Assessment and   Recommendations(SBAR).     Information from the following report(s) Nurse Handoff Report, Adult Overview, Surgery Report, MAR, and Recent Results was reviewed with the receiving nurse.    Opportunity for questions and clarification was provided.      Assessment completed upon patient's arrival to unit and care assumed.      Updated on EtA 1738

## 2024-03-15 VITALS
RESPIRATION RATE: 18 BRPM | BODY MASS INDEX: 34.7 KG/M2 | HEART RATE: 100 BPM | HEIGHT: 64 IN | OXYGEN SATURATION: 99 % | DIASTOLIC BLOOD PRESSURE: 65 MMHG | SYSTOLIC BLOOD PRESSURE: 118 MMHG | WEIGHT: 203.26 LBS | TEMPERATURE: 97.6 F

## 2024-03-15 LAB
ANION GAP SERPL CALC-SCNC: 4 MMOL/L (ref 3–18)
BASOPHILS # BLD: 0 K/UL (ref 0–0.1)
BASOPHILS NFR BLD: 0 % (ref 0–2)
BUN SERPL-MCNC: 9 MG/DL (ref 7–18)
BUN/CREAT SERPL: 12 (ref 12–20)
CALCIUM SERPL-MCNC: 8.4 MG/DL (ref 8.5–10.1)
CHLORIDE SERPL-SCNC: 108 MMOL/L (ref 100–111)
CO2 SERPL-SCNC: 28 MMOL/L (ref 21–32)
CREAT SERPL-MCNC: 0.75 MG/DL (ref 0.6–1.3)
DIFFERENTIAL METHOD BLD: ABNORMAL
EOSINOPHIL # BLD: 0.3 K/UL (ref 0–0.4)
EOSINOPHIL NFR BLD: 4 % (ref 0–5)
ERYTHROCYTE [DISTWIDTH] IN BLOOD BY AUTOMATED COUNT: 11.9 % (ref 11.6–14.5)
ERYTHROCYTE [DISTWIDTH] IN BLOOD BY AUTOMATED COUNT: 12 % (ref 11.6–14.5)
GLUCOSE SERPL-MCNC: 106 MG/DL (ref 74–99)
HCT VFR BLD AUTO: 33.1 % (ref 35–45)
HCT VFR BLD AUTO: 33.5 % (ref 35–45)
HGB BLD-MCNC: 10.9 G/DL (ref 12–16)
HGB BLD-MCNC: 10.9 G/DL (ref 12–16)
IMM GRANULOCYTES # BLD AUTO: 0 K/UL (ref 0–0.04)
IMM GRANULOCYTES NFR BLD AUTO: 1 % (ref 0–0.5)
LACTATE SERPL-SCNC: 0.6 MMOL/L (ref 0.4–2)
LYMPHOCYTES # BLD: 1.7 K/UL (ref 0.9–3.6)
LYMPHOCYTES NFR BLD: 22 % (ref 21–52)
MCH RBC QN AUTO: 32.2 PG (ref 24–34)
MCH RBC QN AUTO: 32.3 PG (ref 24–34)
MCHC RBC AUTO-ENTMCNC: 32.5 G/DL (ref 31–37)
MCHC RBC AUTO-ENTMCNC: 32.9 G/DL (ref 31–37)
MCV RBC AUTO: 97.6 FL (ref 78–100)
MCV RBC AUTO: 99.4 FL (ref 78–100)
MONOCYTES # BLD: 1 K/UL (ref 0.05–1.2)
MONOCYTES NFR BLD: 12 % (ref 3–10)
NEUTS SEG # BLD: 4.9 K/UL (ref 1.8–8)
NEUTS SEG NFR BLD: 61 % (ref 40–73)
NRBC # BLD: 0 K/UL (ref 0–0.01)
NRBC # BLD: 0 K/UL (ref 0–0.01)
NRBC BLD-RTO: 0 PER 100 WBC
NRBC BLD-RTO: 0 PER 100 WBC
PLATELET # BLD AUTO: 250 K/UL (ref 135–420)
PLATELET # BLD AUTO: 254 K/UL (ref 135–420)
PMV BLD AUTO: 10 FL (ref 9.2–11.8)
PMV BLD AUTO: 9.9 FL (ref 9.2–11.8)
POTASSIUM SERPL-SCNC: 3.8 MMOL/L (ref 3.5–5.5)
RBC # BLD AUTO: 3.37 M/UL (ref 4.2–5.3)
RBC # BLD AUTO: 3.39 M/UL (ref 4.2–5.3)
SODIUM SERPL-SCNC: 140 MMOL/L (ref 136–145)
WBC # BLD AUTO: 8 K/UL (ref 4.6–13.2)
WBC # BLD AUTO: 8.8 K/UL (ref 4.6–13.2)

## 2024-03-15 PROCEDURE — G0378 HOSPITAL OBSERVATION PER HR: HCPCS

## 2024-03-15 PROCEDURE — 6370000000 HC RX 637 (ALT 250 FOR IP): Performed by: PLASTIC SURGERY

## 2024-03-15 PROCEDURE — 80048 BASIC METABOLIC PNL TOTAL CA: CPT

## 2024-03-15 PROCEDURE — 85027 COMPLETE CBC AUTOMATED: CPT

## 2024-03-15 PROCEDURE — 96372 THER/PROPH/DIAG INJ SC/IM: CPT

## 2024-03-15 PROCEDURE — 85025 COMPLETE CBC W/AUTO DIFF WBC: CPT

## 2024-03-15 PROCEDURE — 2580000003 HC RX 258: Performed by: PLASTIC SURGERY

## 2024-03-15 PROCEDURE — 96375 TX/PRO/DX INJ NEW DRUG ADDON: CPT

## 2024-03-15 PROCEDURE — 36415 COLL VENOUS BLD VENIPUNCTURE: CPT

## 2024-03-15 PROCEDURE — 83605 ASSAY OF LACTIC ACID: CPT

## 2024-03-15 PROCEDURE — 6360000002 HC RX W HCPCS: Performed by: PLASTIC SURGERY

## 2024-03-15 PROCEDURE — 96376 TX/PRO/DX INJ SAME DRUG ADON: CPT

## 2024-03-15 RX ORDER — KETOROLAC TROMETHAMINE 15 MG/ML
15 INJECTION, SOLUTION INTRAMUSCULAR; INTRAVENOUS EVERY 6 HOURS PRN
Status: DISCONTINUED | OUTPATIENT
Start: 2024-03-15 | End: 2024-03-15 | Stop reason: HOSPADM

## 2024-03-15 RX ADMIN — CLINDAMYCIN HYDROCHLORIDE 300 MG: 150 CAPSULE ORAL at 10:37

## 2024-03-15 RX ADMIN — ENOXAPARIN SODIUM 40 MG: 100 INJECTION SUBCUTANEOUS at 08:27

## 2024-03-15 RX ADMIN — BISACODYL 5 MG: 5 TABLET, COATED ORAL at 08:27

## 2024-03-15 RX ADMIN — HYDROMORPHONE HYDROCHLORIDE 0.5 MG: 1 INJECTION, SOLUTION INTRAMUSCULAR; INTRAVENOUS; SUBCUTANEOUS at 04:44

## 2024-03-15 RX ADMIN — OXYCODONE HYDROCHLORIDE AND ACETAMINOPHEN 2 TABLET: 5; 325 TABLET ORAL at 08:26

## 2024-03-15 RX ADMIN — OXYCODONE HYDROCHLORIDE AND ACETAMINOPHEN 1 TABLET: 5; 325 TABLET ORAL at 15:11

## 2024-03-15 RX ADMIN — SODIUM CHLORIDE, PRESERVATIVE FREE 10 ML: 5 INJECTION INTRAVENOUS at 08:27

## 2024-03-15 RX ADMIN — OXYCODONE HYDROCHLORIDE AND ACETAMINOPHEN 1 TABLET: 5; 325 TABLET ORAL at 00:02

## 2024-03-15 RX ADMIN — KETOROLAC TROMETHAMINE 15 MG: 15 INJECTION, SOLUTION INTRAMUSCULAR; INTRAVENOUS at 12:38

## 2024-03-15 RX ADMIN — SODIUM CHLORIDE, PRESERVATIVE FREE 10 ML: 5 INJECTION INTRAVENOUS at 04:45

## 2024-03-15 RX ADMIN — CYCLOBENZAPRINE 10 MG: 10 TABLET, FILM COATED ORAL at 00:02

## 2024-03-15 RX ADMIN — CLINDAMYCIN HYDROCHLORIDE 300 MG: 150 CAPSULE ORAL at 15:11

## 2024-03-15 RX ADMIN — CYCLOBENZAPRINE 10 MG: 10 TABLET, FILM COATED ORAL at 10:38

## 2024-03-15 RX ADMIN — CLINDAMYCIN HYDROCHLORIDE 300 MG: 150 CAPSULE ORAL at 04:45

## 2024-03-15 ASSESSMENT — PAIN DESCRIPTION - LOCATION
LOCATION: ABDOMEN

## 2024-03-15 ASSESSMENT — ENCOUNTER SYMPTOMS
EYES NEGATIVE: 1
GASTROINTESTINAL NEGATIVE: 1
RESPIRATORY NEGATIVE: 1
ALLERGIC/IMMUNOLOGIC NEGATIVE: 1

## 2024-03-15 ASSESSMENT — PAIN SCALES - GENERAL
PAINLEVEL_OUTOF10: 6
PAINLEVEL_OUTOF10: 7
PAINLEVEL_OUTOF10: 8
PAINLEVEL_OUTOF10: 8
PAINLEVEL_OUTOF10: 6
PAINLEVEL_OUTOF10: 0

## 2024-03-15 ASSESSMENT — PAIN DESCRIPTION - DESCRIPTORS
DESCRIPTORS: ACHING
DESCRIPTORS: ACHING;DISCOMFORT
DESCRIPTORS: CRAMPING;ACHING
DESCRIPTORS: ACHING;DISCOMFORT

## 2024-03-15 NOTE — DISCHARGE INSTRUCTIONS
Regular diet.  Ambulate multiple times daily.  OK to shower.  Abdominal binder or abdominal compression at all times except when showering.  No lifting >10lbs.  Empty and record LUIS output daily.  Rx sent to the patients pharmacy for ABX and pain medication.  Patient to follow up with me in the office this week.  She is to call my office on Monday at [phone] to schedule.

## 2024-03-15 NOTE — PROGRESS NOTES
Bedside and Verbal shift change report given to JEM Cantu (oncoming nurse) by JEM Solomon (offgoing nurse). Report included the following information Nurse Handoff Report, Adult Overview, Intake/Output, and MAR.

## 2024-03-15 NOTE — CARE COORDINATION
03/15/24 1250   Service Assessment   Patient Orientation Alert and Oriented;Person;Place;Situation;Self   Cognition Alert   History Provided By Patient   Primary Caregiver Self   Support Systems Family Members   Patient's Healthcare Decision Maker is: Legal Next of Kin   PCP Verified by CM Yes  (Patient verified her PCP as Monica Roque.)   Prior Functional Level Independent in ADLs/IADLs   Current Functional Level Independent in ADLs/IADLs   Can patient return to prior living arrangement Yes   Ability to make needs known: Good   Family able to assist with home care needs: Yes  (Patient reports that she has support from family members and friends.)   Would you like for me to discuss the discharge plan with any other family members/significant others, and if so, who? Yes  (Mother - Ioana Chavira # [phone])   Financial Resources Other (Comment)  (BCBS OUT OF [facility] and [facility])   Community Resources None   CM/SW Referral Other (see comment)  (N/A)   Social/Functional History   Active  Yes   Type of Occupation    Discharge Planning   Type of Residence House   Living Arrangements Family Members   Current Services Prior To Admission None   Potential Assistance Needed Transportation   DME Ordered? No   Potential Assistance Purchasing Medications No   Type of Home Care Services None   Patient expects to be discharged to: House   Follow Up Appointment: Best Day/Time    (Not at this time)   One/Two Story Residence One story   History of falls? 0   Services At/After Discharge   Transition of Care Consult (CM Consult) N/A   Services At/After Discharge None   Mode of Transport at Discharge   (Family members)   Hospital Transport Time of Discharge   (unknown)   Confirm Follow Up Transport Self   Condition of Participation: Discharge Planning   The Plan for Transition of Care is related to the following treatment goals: \"get better at home\"   The Patient and/or Patient Representative was  provided with a Choice of Provider? Patient  (N/A)   The Patient and/Or Patient Representative agree with the Discharge Plan? Yes   Freedom of Choice list was provided with basic dialogue that supports the patient's individualized plan of care/goals, treatment preferences, and shares the quality data associated with the providers?  No     Patient advised she was transport by EMS to facility and may potentially need transportation home. Patient also provided an update address as [address] [place] [address]. SW updated patient's demographic with the new address.  ABRAHAN inquired with Nurse Supervisor about [place], before obtaining a a follow up responds, patient advised she was able to arrange transportation home. Patient has no CM discharge needs.    BRAEDEN Soriano  Supervisee in Social Work  Care Management Department

## 2024-03-15 NOTE — DISCHARGE SUMMARY
CC: Abdominal pain s/p abdominoplasty    Admission Dx:  Leucocytosis, UTI, Abdominal pain  Discharge DX:  Leucocytosis (resolved), UTI (treated), Abdominal pain (treated)    Disposition: Home    F/U:  With Dr. Wise in office this week,  patient to call the office on Monday to schedule.    Medications:  Rx sent to the patient's pharmacy.  Dilaudid 2mg PO q 4-6 hrs PRN pain, Clindamycin 300mg q 6  hrs x 3 days.    HPI:  27 y/o female s/p abdominoplasy 3/6/24.  Lifted her child on 3/13 which resulted in abdominal pain that did not improve.  Presented to the [facility] ED via EMS.  Evaluation demonstrated tachycardia, WBC of 17k, likely UTI.  Was transferred to [facility] on 3/14/24.  Managed with narcotic pain medication and PO ABX.  This am the patients WBC is normal at 8k, hemodynamically stable.  Benign abdomen on exam.  Tolerating a regular diet and ambulatory.  Deemed appropriate for discharge with routine follow up.

## 2024-03-15 NOTE — PROGRESS NOTES
Spoke to MD at bedside, patient appropriate for D/C patient updated on D/C plan. Plan is to go home with family member.

## 2024-03-15 NOTE — H&P
CC:  Abdominal pain    HPI:  29 y/o female s/p [id number] abdominoplasty on 3/6/2024.  Was doing very well at home during her recovery until 3/13 when she lifted her 6 y/o son.  Had immediate RLQ abdominal pain that did not improve over the next several hours.  The patient contacted EMS and was transported to [facility] on the evening of 3/13.  Overnight the patient was evaluated and found to have a normal lactate, normal H/H and electrolytes.  Questionable UTI, leucocytosis of 17k.  A CT was done that showed scattered fluid in the subcutaneous space and the drain in the soft tissue.  The patient was started on ABX and given pain control.  She remained tachycardic and the ER physician was not comfortable discharging her to follow up with me the same day.  The patient was transferred to [facility] on the evening of 3/14 as the hospitalist at [facility] refused observation admission.    Since admission at [facility], the patient has had improvement in her abdominal pain.  Continues to tolerate a regular diet.  Has been ambulatory to the restroom.  Has been afebrile and hemodynamically stable.    PMH:  Obesity, History of Kidney stones  PSH:  Sleeve gastrectomy, [id number] abdominoplasty 3/6/2024, IUD placement  SHX:  Non-smoker, occasional EtOH, Works as a     Home Meds: Phentermine 37.8mg QOD  Allergies: Cephalexin (rash) Monistat 1 combo pack (rash)    Review of Systems   Constitutional: Negative.    HENT: Negative.     Eyes: Negative.    Respiratory: Negative.     Cardiovascular: Negative.    Gastrointestinal: Negative.    Endocrine: Negative.    Genitourinary: Negative.    Musculoskeletal:         C/O abdominal pain   Skin: Negative.    Allergic/Immunologic: Negative.    Neurological: Negative.    Hematological: Negative.    Psychiatric/Behavioral: Negative.         Physical Exam  Blood pressure 118/65, pulse 100, temperature 97.6 °F (36.4 °C), temperature source Oral, resp. rate 18,  height 1.626 m (5' 4\"), weight 92.2 kg (203 lb 4.2 oz), SpO2 99 %.    AAOx3, NAD  Abd: Incisions clean and dry.  Minimal s/s drainage from around LUIS exit site.  Minimal ecchymosis, no erythema.  No palpable fluid collections.  LUIS in place and functioning normally, clear serous drainage.    Labs this am  WBC 8.0 H/H 10.9/33.1 Plt 254, prior H/H 10.9/33.5    A/P:  Abdominal pain, leucocytosis and possible UTI    Will admit to observation.  CBC in AM.  Pain control.  PO ABX.  Likely home in AM.    Drew Wise MD

## 2024-03-15 NOTE — PROGRESS NOTES
Medicare Outpatient Observation Notice (MOON)/ [place] Outpatient Observation Notice (VOON) provided to patient/representative with verbal explanation of the notice. Time allotted for questions regarding the notice.  Patient /representative provided a completed copy of the MOON/VOON notice.  Copy placed on bedside chart.

## 2025-01-06 NOTE — PLAN OF CARE
Problem: Pain  Goal: Verbalizes/displays adequate comfort level or baseline comfort level  3/15/2024 1345 by Awilda Rodas RN  Outcome: Adequate for Discharge  3/15/2024 0648 by Jemma Connors RN  Outcome: Progressing     Problem: ABCDS Injury Assessment  Goal: Absence of physical injury  Outcome: Adequate for Discharge     Problem: Safety - Adult  Goal: Free from fall injury  3/15/2024 1345 by Awilda Rodas RN  Outcome: Adequate for Discharge  3/15/2024 0648 by Jemma Connors RN  Outcome: Progressing     Problem: ABCDS Injury Assessment  Goal: Absence of physical injury  Outcome: Adequate for Discharge     Problem: Safety - Adult  Goal: Free from fall injury  3/15/2024 1345 by Awilda Rodas RN  Outcome: Adequate for Discharge  3/15/2024 0648 by Jemma Connors RN  Outcome: Progressing     
  Problem: Pain  Goal: Verbalizes/displays adequate comfort level or baseline comfort level  Outcome: Progressing     Problem: Safety - Adult  Goal: Free from fall injury  Outcome: Progressing     
  Problem: Pain  Goal: Verbalizes/displays adequate comfort level or baseline comfort level  Outcome: Progressing     Problem: Safety - Adult  Goal: Free from fall injury  Outcome: Progressing     
no redness/no discharge